# Patient Record
Sex: MALE | URBAN - METROPOLITAN AREA
[De-identification: names, ages, dates, MRNs, and addresses within clinical notes are randomized per-mention and may not be internally consistent; named-entity substitution may affect disease eponyms.]

---

## 2017-07-08 ENCOUNTER — EMERGENCY (EMERGENCY)
Facility: HOSPITAL | Age: 47
LOS: 0 days | Discharge: HOME | End: 2017-07-08

## 2017-07-08 DIAGNOSIS — R05 COUGH: ICD-10-CM

## 2017-07-08 DIAGNOSIS — F17.200 NICOTINE DEPENDENCE, UNSPECIFIED, UNCOMPLICATED: ICD-10-CM

## 2017-07-08 DIAGNOSIS — S29.011A STRAIN OF MUSCLE AND TENDON OF FRONT WALL OF THORAX, INITIAL ENCOUNTER: ICD-10-CM

## 2017-07-08 DIAGNOSIS — R07.9 CHEST PAIN, UNSPECIFIED: ICD-10-CM

## 2017-07-08 DIAGNOSIS — R07.81 PLEURODYNIA: ICD-10-CM
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
## 2017-11-27 ENCOUNTER — OUTPATIENT (OUTPATIENT)
Dept: OUTPATIENT SERVICES | Facility: HOSPITAL | Age: 47
LOS: 1 days | Discharge: HOME | End: 2017-11-27

## 2017-11-27 DIAGNOSIS — R31.21 ASYMPTOMATIC MICROSCOPIC HEMATURIA: ICD-10-CM

## 2017-11-27 DIAGNOSIS — R07.9 CHEST PAIN, UNSPECIFIED: ICD-10-CM

## 2018-05-20 ENCOUNTER — TRANSCRIPTION ENCOUNTER (OUTPATIENT)
Age: 48
End: 2018-05-20

## 2020-01-10 ENCOUNTER — OUTPATIENT (OUTPATIENT)
Dept: OUTPATIENT SERVICES | Facility: HOSPITAL | Age: 50
LOS: 1 days | Discharge: HOME | End: 2020-01-10
Payer: COMMERCIAL

## 2020-01-10 DIAGNOSIS — R52 PAIN, UNSPECIFIED: ICD-10-CM

## 2020-01-10 DIAGNOSIS — R07.9 CHEST PAIN, UNSPECIFIED: ICD-10-CM

## 2020-01-10 PROCEDURE — 76705 ECHO EXAM OF ABDOMEN: CPT | Mod: 26

## 2022-06-05 ENCOUNTER — EMERGENCY (EMERGENCY)
Facility: HOSPITAL | Age: 52
LOS: 0 days | Discharge: HOME | End: 2022-06-06
Attending: EMERGENCY MEDICINE | Admitting: EMERGENCY MEDICINE
Payer: COMMERCIAL

## 2022-06-05 VITALS
TEMPERATURE: 96 F | SYSTOLIC BLOOD PRESSURE: 169 MMHG | HEART RATE: 86 BPM | WEIGHT: 244.93 LBS | OXYGEN SATURATION: 100 % | HEIGHT: 72 IN | RESPIRATION RATE: 20 BRPM | DIASTOLIC BLOOD PRESSURE: 91 MMHG

## 2022-06-05 DIAGNOSIS — Z78.9 OTHER SPECIFIED HEALTH STATUS: Chronic | ICD-10-CM

## 2022-06-05 DIAGNOSIS — Z87.891 PERSONAL HISTORY OF NICOTINE DEPENDENCE: ICD-10-CM

## 2022-06-05 DIAGNOSIS — Z82.49 FAMILY HISTORY OF ISCHEMIC HEART DISEASE AND OTHER DISEASES OF THE CIRCULATORY SYSTEM: ICD-10-CM

## 2022-06-05 DIAGNOSIS — K21.9 GASTRO-ESOPHAGEAL REFLUX DISEASE WITHOUT ESOPHAGITIS: ICD-10-CM

## 2022-06-05 DIAGNOSIS — Z20.822 CONTACT WITH AND (SUSPECTED) EXPOSURE TO COVID-19: ICD-10-CM

## 2022-06-05 DIAGNOSIS — R07.9 CHEST PAIN, UNSPECIFIED: ICD-10-CM

## 2022-06-05 DIAGNOSIS — R05.9 COUGH, UNSPECIFIED: ICD-10-CM

## 2022-06-05 DIAGNOSIS — R07.89 OTHER CHEST PAIN: ICD-10-CM

## 2022-06-05 DIAGNOSIS — R06.02 SHORTNESS OF BREATH: ICD-10-CM

## 2022-06-05 LAB
ALBUMIN SERPL ELPH-MCNC: 4.3 G/DL — SIGNIFICANT CHANGE UP (ref 3.5–5.2)
ALP SERPL-CCNC: 83 U/L — SIGNIFICANT CHANGE UP (ref 30–115)
ALT FLD-CCNC: 33 U/L — SIGNIFICANT CHANGE UP (ref 0–41)
ANION GAP SERPL CALC-SCNC: 13 MMOL/L — SIGNIFICANT CHANGE UP (ref 7–14)
AST SERPL-CCNC: 45 U/L — HIGH (ref 0–41)
BASOPHILS # BLD AUTO: 0.07 K/UL — SIGNIFICANT CHANGE UP (ref 0–0.2)
BASOPHILS NFR BLD AUTO: 0.7 % — SIGNIFICANT CHANGE UP (ref 0–1)
BILIRUB SERPL-MCNC: 0.4 MG/DL — SIGNIFICANT CHANGE UP (ref 0.2–1.2)
BUN SERPL-MCNC: 6 MG/DL — LOW (ref 10–20)
CALCIUM SERPL-MCNC: 9.2 MG/DL — SIGNIFICANT CHANGE UP (ref 8.5–10.1)
CHLORIDE SERPL-SCNC: 102 MMOL/L — SIGNIFICANT CHANGE UP (ref 98–110)
CO2 SERPL-SCNC: 21 MMOL/L — SIGNIFICANT CHANGE UP (ref 17–32)
CREAT SERPL-MCNC: 1.1 MG/DL — SIGNIFICANT CHANGE UP (ref 0.7–1.5)
EGFR: 81 ML/MIN/1.73M2 — SIGNIFICANT CHANGE UP
EOSINOPHIL # BLD AUTO: 0.17 K/UL — SIGNIFICANT CHANGE UP (ref 0–0.7)
EOSINOPHIL NFR BLD AUTO: 1.6 % — SIGNIFICANT CHANGE UP (ref 0–8)
FLUAV AG NPH QL: SIGNIFICANT CHANGE UP
FLUBV AG NPH QL: SIGNIFICANT CHANGE UP
GLUCOSE SERPL-MCNC: 89 MG/DL — SIGNIFICANT CHANGE UP (ref 70–99)
HCT VFR BLD CALC: 43.9 % — SIGNIFICANT CHANGE UP (ref 42–52)
HGB BLD-MCNC: 15.4 G/DL — SIGNIFICANT CHANGE UP (ref 14–18)
IMM GRANULOCYTES NFR BLD AUTO: 0.5 % — HIGH (ref 0.1–0.3)
LYMPHOCYTES # BLD AUTO: 3.21 K/UL — SIGNIFICANT CHANGE UP (ref 1.2–3.4)
LYMPHOCYTES # BLD AUTO: 30.1 % — SIGNIFICANT CHANGE UP (ref 20.5–51.1)
MCHC RBC-ENTMCNC: 30.3 PG — SIGNIFICANT CHANGE UP (ref 27–31)
MCHC RBC-ENTMCNC: 35.1 G/DL — SIGNIFICANT CHANGE UP (ref 32–37)
MCV RBC AUTO: 86.2 FL — SIGNIFICANT CHANGE UP (ref 80–94)
MONOCYTES # BLD AUTO: 0.71 K/UL — HIGH (ref 0.1–0.6)
MONOCYTES NFR BLD AUTO: 6.7 % — SIGNIFICANT CHANGE UP (ref 1.7–9.3)
NEUTROPHILS # BLD AUTO: 6.45 K/UL — SIGNIFICANT CHANGE UP (ref 1.4–6.5)
NEUTROPHILS NFR BLD AUTO: 60.4 % — SIGNIFICANT CHANGE UP (ref 42.2–75.2)
NRBC # BLD: 0 /100 WBCS — SIGNIFICANT CHANGE UP (ref 0–0)
NT-PROBNP SERPL-SCNC: 37 PG/ML — SIGNIFICANT CHANGE UP (ref 0–300)
PLATELET # BLD AUTO: 319 K/UL — SIGNIFICANT CHANGE UP (ref 130–400)
POTASSIUM SERPL-MCNC: 5 MMOL/L — SIGNIFICANT CHANGE UP (ref 3.5–5)
POTASSIUM SERPL-SCNC: 5 MMOL/L — SIGNIFICANT CHANGE UP (ref 3.5–5)
PROT SERPL-MCNC: 7.4 G/DL — SIGNIFICANT CHANGE UP (ref 6–8)
RBC # BLD: 5.09 M/UL — SIGNIFICANT CHANGE UP (ref 4.7–6.1)
RBC # FLD: 12.8 % — SIGNIFICANT CHANGE UP (ref 11.5–14.5)
RSV RNA NPH QL NAA+NON-PROBE: SIGNIFICANT CHANGE UP
SARS-COV-2 RNA SPEC QL NAA+PROBE: SIGNIFICANT CHANGE UP
SODIUM SERPL-SCNC: 136 MMOL/L — SIGNIFICANT CHANGE UP (ref 135–146)
TROPONIN T SERPL-MCNC: <0.01 NG/ML — SIGNIFICANT CHANGE UP
TROPONIN T SERPL-MCNC: <0.01 NG/ML — SIGNIFICANT CHANGE UP
WBC # BLD: 10.66 K/UL — SIGNIFICANT CHANGE UP (ref 4.8–10.8)
WBC # FLD AUTO: 10.66 K/UL — SIGNIFICANT CHANGE UP (ref 4.8–10.8)

## 2022-06-05 PROCEDURE — 99220: CPT

## 2022-06-05 PROCEDURE — 93010 ELECTROCARDIOGRAM REPORT: CPT | Mod: 77

## 2022-06-05 PROCEDURE — 93010 ELECTROCARDIOGRAM REPORT: CPT

## 2022-06-05 NOTE — ED PROVIDER NOTE - NS ED ATTENDING STATEMENT MOD
This was a shared visit with the TAMMY. I reviewed and verified the documentation and independently performed the documented:

## 2022-06-05 NOTE — ED CDU PROVIDER INITIAL DAY NOTE - MEDICAL DECISION MAKING DETAILS
52-year-old man, history of GERD, recently quit smoking complains of chest tightness over the last 3 days, with cough.  Pain usually comes with cough, patient reports son had similar symptoms recently.  Vital signs, exam as noted.  ED work-up was unremarkable, plan is for telemetry, serial EKG and enzymes, provocative testing.

## 2022-06-05 NOTE — ED PROVIDER NOTE - NSICDXFAMILYHX_GEN_ALL_CORE_FT
FAMILY HISTORY:  No pertinent family history in first degree relatives FAMILY HISTORY:  Father  Still living? Yes, Estimated age: Age Unknown  FH: HTN (hypertension), Age at diagnosis: Age Unknown    Mother  Still living? No  FH: HTN (hypertension), Age at diagnosis: Age Unknown

## 2022-06-05 NOTE — ED CDU PROVIDER INITIAL DAY NOTE - RESPIRATORY NEGATIVE STATEMENT, MLM
Recommended Diet (from SLP):   1. Pureed solids.  2. Thin liquids, no straws.  3. Medications crushed in puree (I.e. pudding, applesauce, yogurt).    OP coordinator will call to set up video swallow study approximately two weeks from 4/10.   + chest pain, no cough, and no shortness of breath.

## 2022-06-05 NOTE — ED CDU PROVIDER INITIAL DAY NOTE - NSICDXFAMILYHX_GEN_ALL_CORE_FT
FAMILY HISTORY:  Father  Still living? Yes, Estimated age: Age Unknown  FH: HTN (hypertension), Age at diagnosis: Age Unknown    Mother  Still living? No  FH: HTN (hypertension), Age at diagnosis: Age Unknown

## 2022-06-05 NOTE — ED CDU PROVIDER INITIAL DAY NOTE - OBJECTIVE STATEMENT
52 y.o. male with pmx of gerd, former smoker quit 1/22 comes to ed complain of chest pain x 3 days, states son had uri type symptoms, admit to cough and chest discomfort.

## 2022-06-05 NOTE — ED PROVIDER NOTE - ATTENDING APP SHARED VISIT CONTRIBUTION OF CARE
52-year-old male past medical history as documented presenting with shortness of breath x3 days associated with cough and nasal congestion.  Also states he is having chest pain over this time described as tight, substernal, nonradiating, no palliative or provocative factors, mild severity.  Denies symptoms before.  Otherwise denies fever, nausea/vomiting/diarrhea, blood in stool or any other complaints.    Vital Signs: I have reviewed the initial vital signs.  Constitutional: NAD, well-nourished, appears stated age, no acute distress.  HEENT: Airway patent, moist MM, no erythema/swelling/deformity of oral structures. EOMI, PERRLA.  CV: regular rate, regular rhythm, well-perfused extremities, 2+ b/l DP and radial pulses equal.  Lungs: BCTA, no increased WOB.  ABD: NTND, no guarding or rebound, no pulsatile mass, no hernias.   MSK: Neck supple, nontender, nl ROM, no stepoff. Chest nontender. Back nontender in TLS spine or to b/l bony structures or flanks. Ext nontender, nl rom, no deformity.   INTEG: Skin warm, dry, no rash.  NEURO: A&Ox3, normal strength, nl sensation throughout, normal speech.   PSYCH: Calm, cooperative, normal affect and interaction.    Will obtain EKG, chest x-ray, labs, reeval.

## 2022-06-05 NOTE — ED ADULT NURSE NOTE - NSFALLRSKOUTCOME_ED_ALL_ED
Universal Safety Interventions Interpolation Flap Text: A decision was made to reconstruct the defect utilizing an interpolation axial flap and a staged reconstruction.  A telfa template was made of the defect.  This telfa template was then used to outline the interpolation flap.  The donor area for the pedicle flap was then injected with anesthesia.  The flap was excised through the skin and subcutaneous tissue down to the layer of the underlying musculature.  The interpolation flap was carefully excised within this deep plane to maintain its blood supply.  The edges of the donor site were undermined.   The donor site was closed in a primary fashion.  The pedicle was then rotated into position and sutured.  Once the tube was sutured into place, adequate blood supply was confirmed with blanching and refill.  The pedicle was then wrapped with xeroform gauze and dressed appropriately with a telfa and gauze bandage to ensure continued blood supply and protect the attached pedicle.

## 2022-06-05 NOTE — ED PROVIDER NOTE - OBJECTIVE STATEMENT
53 y/o M, PMHx GERD, presents to the ED with complaints of shortness of breath x three days. He admits to dyspnea upon exertion with accompanying cough and nasal congestion. He states that his son recently was ill with the flu. He admits to chest discomfort when coughing; denies abdominal pain, back pain, fever, chills, lower extremity swelling, nausea, and vomiting. He is a former smoker; denies FHx of CAD.

## 2022-06-05 NOTE — ED PROVIDER NOTE - CLINICAL SUMMARY MEDICAL DECISION MAKING FREE TEXT BOX
Patient presented with shortness of breath x 3 days associated with chest pain. Otherwise afebrile, Hd stable, well appearing. EKG obtained and non-ischemic without evidence of STEMI. Obtained labs which were grossly unremarkable including no significant leukocytosis, anemia, signs of dehydration/ELVIA, transaminitis or significant electrolyte abnormalities. Trop negative. Chest xray negative for pneumothorax, pneumonia, widened mediastinum, evidence of rib fractures, enlarged cardiac silhouette or any other emergent pathologies. Patient remained without recurrence of chest pain or abnormalities during monitoring in ED. Ambulatory without difficulty, tolerates PO. HEART Score 1, no PE risk factors and not tachycardic or hypoxic to suggest this, no concern clinically for dissection. Given the above, will place in obs for ACS rule out. Patient agreeable with plan.

## 2022-06-06 VITALS
TEMPERATURE: 97 F | DIASTOLIC BLOOD PRESSURE: 85 MMHG | HEART RATE: 80 BPM | OXYGEN SATURATION: 100 % | SYSTOLIC BLOOD PRESSURE: 162 MMHG | RESPIRATION RATE: 18 BRPM

## 2022-06-06 PROCEDURE — G1004: CPT

## 2022-06-06 PROCEDURE — 99217: CPT

## 2022-06-06 PROCEDURE — 93016 CV STRESS TEST SUPVJ ONLY: CPT

## 2022-06-06 PROCEDURE — 71045 X-RAY EXAM CHEST 1 VIEW: CPT | Mod: 26

## 2022-06-06 PROCEDURE — 93018 CV STRESS TEST I&R ONLY: CPT

## 2022-06-06 PROCEDURE — 78452 HT MUSCLE IMAGE SPECT MULT: CPT | Mod: 26,ME

## 2022-06-06 RX ORDER — ADENOSINE 3 MG/ML
60 INJECTION INTRAVENOUS ONCE
Refills: 0 | Status: COMPLETED | OUTPATIENT
Start: 2022-06-06 | End: 2022-06-06

## 2022-06-06 RX ADMIN — ADENOSINE 600 MILLIGRAM(S): 3 INJECTION INTRAVENOUS at 14:07

## 2022-06-06 NOTE — ED CDU PROVIDER SUBSEQUENT DAY NOTE - MEDICAL DECISION MAKING DETAILS
52-year-old man, history of GERD, recently quit smoking complains of chest tightness over the last 3 days, with cough.  Pain usually comes with cough, patient reports son had similar symptoms recently.  Vital signs, exam as noted.  ED work-up was unremarkable. Patient was monitored without incident, repeat EKG and enzymes unchanged. Nuclear stress test was negative for ischemia.  Results were discussed with patient, and he was given copies.  He will follow-up PMD and return to the ED for persistent or worsening symptoms.

## 2022-06-06 NOTE — ED CDU PROVIDER SUBSEQUENT DAY NOTE - NS ED MD PROGRESS NOTE PROVIDER NAME FT
EVELYNE Jay Secondary Intention Text (Leave Blank If You Do Not Want): The defect will heal with secondary intention.

## 2022-06-06 NOTE — ED CDU PROVIDER DISPOSITION NOTE - CLINICAL COURSE
Admitted to EDOU for dyspnea on exertion. Trop neg x2, ECG WNL, stress test NL. Will f/u w/ cardiologist.

## 2022-06-06 NOTE — ED CDU PROVIDER DISPOSITION NOTE - PATIENT PORTAL LINK FT
You can access the FollowMyHealth Patient Portal offered by [facility] by registering at the following website: [email or web address]. By joining [employer]’s FollowMyHealth portal, you will also be able to view your health information using other applications (apps) compatible with our system.

## 2022-06-06 NOTE — ED CDU PROVIDER SUBSEQUENT DAY NOTE - ATTENDING APP SHARED VISIT CONTRIBUTION OF CARE
52-year-old man, history of GERD, recently quit smoking complains of chest tightness over the last 3 days, with cough.  Pain usually comes with cough, patient reports son had similar symptoms recently.  Vital signs, exam as noted.  ED work-up was unremarkable. Patient was monitored without incident, repeat EKG and enzymes unchanged.  Plan is for nuclear stress test.

## 2022-06-06 NOTE — ED CDU PROVIDER DISPOSITION NOTE - CARE PROVIDER_API CALL
Pj Ba)  Cardiovascular Disease; Interventional Cardiology  [address] [place] [address]  Phone: [phone]  Fax: [phone]  Established Patient  Follow Up Time: 1-3 Days

## 2022-06-06 NOTE — ED CDU PROVIDER DISPOSITION NOTE - NSFOLLOWUPINSTRUCTIONS_ED_ALL_ED_FT
Please follow up with Dr Ba in 1-3 days for further evaluation. Return to the emergency department sooner for any new or worsening symptoms.      Shortness of breath    Shortness of breath means you have trouble breathing and could indicate a medical problem. Causes include lung diseases, heart disease, low amount of red blood cells (anemia), poor physical fitness, being overweight, smoking, etc. Your health care provider may not be able to find a cause for your shortness of breath after your exam. In this case, it is important to have a follow-up exam with your primary care physician as instructed. If medicines were prescribed, take them as directed for the full length of time directed. Refrain from tobacco products.    SEEK IMMEDIATE MEDICAL CARE IF YOU HAVE THE FOLLOWING SYMPTOMS: worsening shortness of breath, chest pain, back pain, abdominal pain, fever, coughing up blood, lightheadedness/dizziness.

## 2022-06-06 NOTE — ED CDU PROVIDER SUBSEQUENT DAY NOTE - PROGRESS NOTE DETAILS
Pt resting comfortably, denies complaints at this time. Resting comfortably. Reports hx of dyspnea on exertion preceding son's flu symptoms. Pending pharm stress as pt has hx of knee issues. Reassess.

## 2022-07-26 NOTE — ED ADULT NURSE NOTE - TEMPLATE
Regular rate & rhythm, normal S1, S2; no murmurs, gallops or rubs; no S3, S4
Respiratory
Normal vision: sees adequately in most situations; can see medication labels, newsprint

## 2024-05-21 PROBLEM — K21.9 GASTRO-ESOPHAGEAL REFLUX DISEASE WITHOUT ESOPHAGITIS: Chronic | Status: ACTIVE | Noted: 2022-06-05

## 2024-06-06 ENCOUNTER — APPOINTMENT (OUTPATIENT)
Dept: ORTHOPEDIC SURGERY | Facility: CLINIC | Age: 54
End: 2024-06-06
Payer: COMMERCIAL

## 2024-06-06 ENCOUNTER — TRANSCRIPTION ENCOUNTER (OUTPATIENT)
Age: 54
End: 2024-06-06

## 2024-06-06 DIAGNOSIS — M16.12 UNILATERAL PRIMARY OSTEOARTHRITIS, LEFT HIP: ICD-10-CM

## 2024-06-06 PROBLEM — Z00.00 ENCOUNTER FOR PREVENTIVE HEALTH EXAMINATION: Status: ACTIVE | Noted: 2024-06-06

## 2024-06-06 PROCEDURE — 99203 OFFICE O/P NEW LOW 30 MIN: CPT

## 2024-06-06 PROCEDURE — 73522 X-RAY EXAM HIPS BI 3-4 VIEWS: CPT

## 2024-06-06 NOTE — ASSESSMENT
[FreeTextEntry1] : 54-year-old  with advanced left hip arthritis.  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  Ultimately this patient is probably going to benefit from a hip replacement which will significantly improve his pain and allow him to function as a .  He could try cortisone injection in his left hip provided by interventional radiology which might mitigate the symptoms temporarily.  Alternatively he could consider hip replacement.  This would entail at a minimum probably 6 weeks of hiatus from work.  We can reach out to Worker's Compensation to see if he is eligible for a Worker's Compensation case to allow the surgery be done under the auspices of Worker's Compensation.  This process is going to take a prolonged period of time we can arrange for the cortisone injection.  In the interim I will have him continue with the Clofenax and what ever physical therapy he can tolerate to improve and maintain his function.

## 2024-06-06 NOTE — HISTORY OF PRESENT ILLNESS
[de-identified] : 54-year-old  for the [employer] presents on referral for evaluation of left groin and buttock pain.  He has been followed by pain management for back pain although he does not really report much in the way of back pain.  He has been offered an epidural but is reticent to proceed with this.  He had an MRI which incidentally picked up an adrenal mass and is now scheduled for CT of his abdomen to rule adrenal cortical adenoma.  He does not report any particularly rapid heart rate or other signs of adrenal disease.  When he does have his complaints of buttock and groin pain.  This is unremitting pain that makes it difficult for him to sleep or walk distances.  This pain has been worsening over the last year without clear trauma.  He is currently on diclofenac and gabapentin teen which are not meaningfully improving his symptoms.  He is interested in to what is causing his pain and what can be done about it so he can continue to work as a  for the [employer].  Past medical history: Benign prostatic hypertrophy Being worked up for adrenal mass May have hyperlipidemia he is also followed by Dr. Chaves; worked up for cardiac issue which turned out to be negative.  Medications: Gabapentin Diclofenac  Allergies NKDA  Social: , lives with wife, former smoker now utilizes a vape, rare social EtOH, no drug use Works for [employer] as a

## 2024-06-06 NOTE — IMAGING
[de-identified] : Pleasant early middle-aged gentleman walks into my office with clear antalgia.  Physical examination: Left hip: Tenderness palpation about the inguinal crease without clear lymph nodes.  No tenderness of the trochanteric bursa.  Full extension the patient has internal rotation of 10 degrees with pain.  At 90 degrees of flexion he essentially has no internal rotation and has to abduct his leg to accommodate 90 degrees of flexion and slight external rotation.  Radiographs: Left hip (AP pelvis, lateral left hip): Advanced left hip arthritis with loss of the left hip joint space.  There is subchondral sclerotic changes and early subchondral cystic changes within the femoral head in the weightbearing portion of the left acetabulum.

## 2024-09-06 ENCOUNTER — APPOINTMENT (OUTPATIENT)
Dept: ORTHOPEDIC SURGERY | Facility: CLINIC | Age: 54
End: 2024-09-06
Payer: COMMERCIAL

## 2024-09-06 DIAGNOSIS — M16.12 UNILATERAL PRIMARY OSTEOARTHRITIS, LEFT HIP: ICD-10-CM

## 2024-09-06 PROCEDURE — 99214 OFFICE O/P EST MOD 30 MIN: CPT

## 2024-09-06 NOTE — HISTORY OF PRESENT ILLNESS
[de-identified] : 54-year-old gentleman returns to the office with severe anterior lateral left hip pain.  Working diagnosis left hip arthritis.  He has been followed by pain management who has been giving him a series of injections about the trochanteric bursa.  He has had 3 cortisone injections the last was last Wednesday.  None of which have significantly helped his pain.  He remains on tizanidine and meloxicam.  Finds tizanidine helps him sleep.  Denies any new trauma.  No fevers or constitutional symptoms.

## 2024-09-06 NOTE — ASSESSMENT
[FreeTextEntry1] : 54-year-old gentleman with advanced left hip arthritis failing conservative management of physical therapy NSAIDs started at his last visit now would benefit from total hip arthroplasty.  Because of his recent cortisone injection would hold off for at least 3 months prior to surgery.  I discussed with the patient my findings and recommendations.  Reviewed the nature of the surgery with risk and benefits.  He like to proceed in this timely fashion possible.  Will make arrangements for surgery in 3 months.  He will need preadmission testing.

## 2024-09-06 NOTE — IMAGING
[de-identified] : Pleasant early middle-aged gentleman walks in with antalgia.  Physical examination: Left hip: Tenderness palpation of the trochanteric bursa as well as over the anterior lateral aspect of his hip.  He has pain with rotation of the hip joint at full extension 9 degrees of flexion.  Rotation is limited.  Flexion is also limited secondary to this pain.  He has no geniculate lymph nodes or masses.  Radiographs: Deferred; radiographs from June 6, 2024 reviewed.  Demonstrate loss of joint space of the left hip with subchondral cystic changes.

## 2024-09-24 NOTE — ED ADULT NURSE NOTE - NS ED NURSE DC PT EDUCATION RESOURCES
Quality 130: Documentation Of Current Medications In The Medical Record: Current Medications Documented Detail Level: Detailed Quality 226: Preventive Care And Screening: Tobacco Use: Screening And Cessation Intervention: Patient screened for tobacco use and is an ex/non-smoker None needed

## 2024-10-16 ENCOUNTER — TRANSCRIPTION ENCOUNTER (OUTPATIENT)
Age: 54
End: 2024-10-16

## 2024-10-17 ENCOUNTER — TRANSCRIPTION ENCOUNTER (OUTPATIENT)
Age: 54
End: 2024-10-17

## 2024-12-03 ENCOUNTER — RESULT REVIEW (OUTPATIENT)
Age: 54
End: 2024-12-03

## 2024-12-03 ENCOUNTER — OUTPATIENT (OUTPATIENT)
Dept: OUTPATIENT SERVICES | Facility: HOSPITAL | Age: 54
LOS: 1 days | End: 2024-12-03
Payer: COMMERCIAL

## 2024-12-03 VITALS
WEIGHT: 255.07 LBS | HEART RATE: 86 BPM | TEMPERATURE: 98 F | RESPIRATION RATE: 18 BRPM | SYSTOLIC BLOOD PRESSURE: 160 MMHG | HEIGHT: 70 IN | OXYGEN SATURATION: 99 % | DIASTOLIC BLOOD PRESSURE: 97 MMHG

## 2024-12-03 DIAGNOSIS — M16.12 UNILATERAL PRIMARY OSTEOARTHRITIS, LEFT HIP: ICD-10-CM

## 2024-12-03 DIAGNOSIS — Z01.818 ENCOUNTER FOR OTHER PREPROCEDURAL EXAMINATION: ICD-10-CM

## 2024-12-03 DIAGNOSIS — Z78.9 OTHER SPECIFIED HEALTH STATUS: Chronic | ICD-10-CM

## 2024-12-03 LAB
A1C WITH ESTIMATED AVERAGE GLUCOSE RESULT: 6.4 % — HIGH (ref 4–5.6)
ALBUMIN SERPL ELPH-MCNC: 4.5 G/DL — SIGNIFICANT CHANGE UP (ref 3.5–5.2)
ALP SERPL-CCNC: 94 U/L — SIGNIFICANT CHANGE UP (ref 30–115)
ALT FLD-CCNC: 58 U/L — HIGH (ref 0–41)
ANION GAP SERPL CALC-SCNC: 16 MMOL/L — HIGH (ref 7–14)
APTT BLD: 31.7 SEC — SIGNIFICANT CHANGE UP (ref 27–39.2)
AST SERPL-CCNC: 40 U/L — SIGNIFICANT CHANGE UP (ref 0–41)
BASOPHILS # BLD AUTO: 0.07 K/UL — SIGNIFICANT CHANGE UP (ref 0–0.2)
BASOPHILS NFR BLD AUTO: 0.8 % — SIGNIFICANT CHANGE UP (ref 0–1)
BILIRUB SERPL-MCNC: 0.5 MG/DL — SIGNIFICANT CHANGE UP (ref 0.2–1.2)
BLD GP AB SCN SERPL QL: SIGNIFICANT CHANGE UP
BUN SERPL-MCNC: 10 MG/DL — SIGNIFICANT CHANGE UP (ref 10–20)
CALCIUM SERPL-MCNC: 10 MG/DL — SIGNIFICANT CHANGE UP (ref 8.4–10.5)
CHLORIDE SERPL-SCNC: 98 MMOL/L — SIGNIFICANT CHANGE UP (ref 98–110)
CO2 SERPL-SCNC: 25 MMOL/L — SIGNIFICANT CHANGE UP (ref 17–32)
CREAT SERPL-MCNC: 1 MG/DL — SIGNIFICANT CHANGE UP (ref 0.7–1.5)
EGFR: 90 ML/MIN/1.73M2 — SIGNIFICANT CHANGE UP
EOSINOPHIL # BLD AUTO: 0.15 K/UL — SIGNIFICANT CHANGE UP (ref 0–0.7)
EOSINOPHIL NFR BLD AUTO: 1.7 % — SIGNIFICANT CHANGE UP (ref 0–8)
ESTIMATED AVERAGE GLUCOSE: 137 MG/DL — HIGH (ref 68–114)
GLUCOSE SERPL-MCNC: 169 MG/DL — HIGH (ref 70–99)
HCT VFR BLD CALC: 47.3 % — SIGNIFICANT CHANGE UP (ref 42–52)
HGB BLD-MCNC: 15.9 G/DL — SIGNIFICANT CHANGE UP (ref 14–18)
IMM GRANULOCYTES NFR BLD AUTO: 0.8 % — HIGH (ref 0.1–0.3)
INR BLD: 0.88 RATIO — SIGNIFICANT CHANGE UP (ref 0.65–1.3)
LYMPHOCYTES # BLD AUTO: 2.83 K/UL — SIGNIFICANT CHANGE UP (ref 1.2–3.4)
LYMPHOCYTES # BLD AUTO: 32.4 % — SIGNIFICANT CHANGE UP (ref 20.5–51.1)
MCHC RBC-ENTMCNC: 29.1 PG — SIGNIFICANT CHANGE UP (ref 27–31)
MCHC RBC-ENTMCNC: 33.6 G/DL — SIGNIFICANT CHANGE UP (ref 32–37)
MCV RBC AUTO: 86.6 FL — SIGNIFICANT CHANGE UP (ref 80–94)
MONOCYTES # BLD AUTO: 0.66 K/UL — HIGH (ref 0.1–0.6)
MONOCYTES NFR BLD AUTO: 7.6 % — SIGNIFICANT CHANGE UP (ref 1.7–9.3)
MRSA PCR RESULT.: NEGATIVE — SIGNIFICANT CHANGE UP
NEUTROPHILS # BLD AUTO: 4.95 K/UL — SIGNIFICANT CHANGE UP (ref 1.4–6.5)
NEUTROPHILS NFR BLD AUTO: 56.7 % — SIGNIFICANT CHANGE UP (ref 42.2–75.2)
NRBC # BLD: 0 /100 WBCS — SIGNIFICANT CHANGE UP (ref 0–0)
PLATELET # BLD AUTO: 269 K/UL — SIGNIFICANT CHANGE UP (ref 130–400)
PMV BLD: 9.2 FL — SIGNIFICANT CHANGE UP (ref 7.4–10.4)
POTASSIUM SERPL-MCNC: 4.3 MMOL/L — SIGNIFICANT CHANGE UP (ref 3.5–5)
POTASSIUM SERPL-SCNC: 4.3 MMOL/L — SIGNIFICANT CHANGE UP (ref 3.5–5)
PROT SERPL-MCNC: 7.1 G/DL — SIGNIFICANT CHANGE UP (ref 6–8)
PROTHROM AB SERPL-ACNC: 10.3 SEC — SIGNIFICANT CHANGE UP (ref 9.95–12.87)
RBC # BLD: 5.46 M/UL — SIGNIFICANT CHANGE UP (ref 4.7–6.1)
RBC # FLD: 13.3 % — SIGNIFICANT CHANGE UP (ref 11.5–14.5)
SODIUM SERPL-SCNC: 139 MMOL/L — SIGNIFICANT CHANGE UP (ref 135–146)
WBC # BLD: 8.73 K/UL — SIGNIFICANT CHANGE UP (ref 4.8–10.8)
WBC # FLD AUTO: 8.73 K/UL — SIGNIFICANT CHANGE UP (ref 4.8–10.8)

## 2024-12-03 PROCEDURE — 85730 THROMBOPLASTIN TIME PARTIAL: CPT

## 2024-12-03 PROCEDURE — 86901 BLOOD TYPING SEROLOGIC RH(D): CPT

## 2024-12-03 PROCEDURE — 85025 COMPLETE CBC W/AUTO DIFF WBC: CPT

## 2024-12-03 PROCEDURE — 99214 OFFICE O/P EST MOD 30 MIN: CPT | Mod: 25

## 2024-12-03 PROCEDURE — 73502 X-RAY EXAM HIP UNI 2-3 VIEWS: CPT | Mod: 26,LT

## 2024-12-03 PROCEDURE — 86850 RBC ANTIBODY SCREEN: CPT

## 2024-12-03 PROCEDURE — 36415 COLL VENOUS BLD VENIPUNCTURE: CPT

## 2024-12-03 PROCEDURE — 87641 MR-STAPH DNA AMP PROBE: CPT

## 2024-12-03 PROCEDURE — 87640 STAPH A DNA AMP PROBE: CPT

## 2024-12-03 PROCEDURE — 83036 HEMOGLOBIN GLYCOSYLATED A1C: CPT

## 2024-12-03 PROCEDURE — 85610 PROTHROMBIN TIME: CPT

## 2024-12-03 PROCEDURE — 73502 X-RAY EXAM HIP UNI 2-3 VIEWS: CPT | Mod: LT

## 2024-12-03 PROCEDURE — 80053 COMPREHEN METABOLIC PANEL: CPT

## 2024-12-03 PROCEDURE — 86900 BLOOD TYPING SEROLOGIC ABO: CPT

## 2024-12-03 NOTE — H&P PST ADULT - REASON FOR ADMISSION
Case Type: OP Block TimeSuite: [facility] Mohsen [facility]  Confirmed Surgery DateTime: 12- - 0:00PAST DateTime: 12- - 9:30Procedure: DIRECT ANTERIOR LEFT TOTAL HIP REPLACEMENT (C-ARM)  ERP?: NoLaterality: LeftLength of Procedure: 120 Minutes  Anesthesia Type: Regional

## 2024-12-03 NOTE — H&P PST ADULT - HISTORY OF PRESENT ILLNESS
55 y/o M  presents to PAST with a 12 month history of LEFT hjp pain. Pt is moderately active and reports having sharp and "very painfull"  hip pain with prolonged walking or sitting only. Pt has tried 2 months of rest w/o anti-inflammatory medications, follow by a 3 month course of physical therapy w/o significant relief. In addition, the pt received cortisone injection 3 months ago ( in September)  with provided 100 % relief of pain for 2-3 weeks. pain is present with flexion and extension.  PATIENT/GUARDIAN CURRENTLY DENIES CHEST PAIN  SHORTNESS OF BREATH  PALPITATIONS,  DYSURIA, OR UPPER RESPIRATORY INFECTION IN PAST 2 WEEKS    Anesthesia Alert  NO--Difficult Airway, class IV  NO--History of neck surgery or radiation  NO--Limited ROM of neck  NO--History of Malignant hyperthermia  NO--No personal or family history of Pseudocholinesterase deficiency.  NO--Prior Anesthesia Complication  NO--Latex Allergy  NO--Loose teeth  NO--History of Rheumatoid Arthritis  NO--Bleeding risk  NO--EFREM  NO--Other  RCRI 0  [facility] Activity Status Index (DASI) from [employer]  on 12/3/2024  ** All calculations should be rechecked by clinician prior to use **    RESULT SUMMARY:  24.2 points  The higher the score (maximum 58.2), the higher the functional status.    5.72 METs    INPUTS:  Take care of self —> 2.75 = Yes  Walk indoors —> 1.75 = Yes  Walk 1&ndash;2 blocks on level ground —> 2.75 = Yes  Climb a flight of stairs or walk up a hill —> 5.5 = Yes  Run a short distance —> 0 = No  Do light work around the house —> 2.7 = Yes  Do moderate work around the house —> 3.5 = Yes  Do heavy work around the house —> 0 = No  Do yardwork —> 0 = No  Have sexual relations —> 5.25 = Yes  Participate in moderate recreational activities —> 0 = No  Participate in strenuous sports —> 0 = No    PT/GUARDIAN DENIES ANY RASHES, ABRASION, OR OPEN WOUNDS OR CUTS    AS PER THE PT/GUARDIAN, THIS IS HIS/HER COMPLETE MEDICAL AND SURGICAL HX, INCLUDING MEDICATIONS PRESCRIBED AND OVER THE COUNTER  Patient/guardian understands the instructions and was given the opportunity to ask questions and have them answered.  pt/guardian denies any suicidal ideation or thoughts, pt states not a threat to self or others

## 2024-12-04 DIAGNOSIS — Z01.818 ENCOUNTER FOR OTHER PREPROCEDURAL EXAMINATION: ICD-10-CM

## 2024-12-04 DIAGNOSIS — M16.12 UNILATERAL PRIMARY OSTEOARTHRITIS, LEFT HIP: ICD-10-CM

## 2024-12-04 RX ORDER — CELECOXIB 200 MG/1
400 CAPSULE ORAL ONCE
Refills: 0 | Status: COMPLETED | OUTPATIENT
Start: 2024-12-12 | End: 2024-12-12

## 2024-12-05 RX ORDER — ACETAMINOPHEN 500MG 500 MG/1
1000 TABLET, COATED ORAL ONCE
Refills: 0 | Status: COMPLETED | OUTPATIENT
Start: 2024-12-12 | End: 2024-12-12

## 2024-12-06 PROBLEM — E66.9 OBESITY, UNSPECIFIED: Chronic | Status: INACTIVE | Noted: 2024-12-03 | Resolved: 2024-12-05

## 2024-12-12 ENCOUNTER — RESULT REVIEW (OUTPATIENT)
Age: 54
End: 2024-12-12

## 2024-12-12 ENCOUNTER — APPOINTMENT (OUTPATIENT)
Dept: ORTHOPEDIC SURGERY | Facility: HOSPITAL | Age: 54
End: 2024-12-12

## 2024-12-12 ENCOUNTER — INPATIENT (INPATIENT)
Facility: HOSPITAL | Age: 54
LOS: 0 days | Discharge: HOME HEALTH | DRG: 470 | End: 2024-12-13
Attending: ORTHOPAEDIC SURGERY | Admitting: ORTHOPAEDIC SURGERY
Payer: COMMERCIAL

## 2024-12-12 ENCOUNTER — TRANSCRIPTION ENCOUNTER (OUTPATIENT)
Age: 54
End: 2024-12-12

## 2024-12-12 VITALS
SYSTOLIC BLOOD PRESSURE: 159 MMHG | TEMPERATURE: 98 F | DIASTOLIC BLOOD PRESSURE: 94 MMHG | OXYGEN SATURATION: 99 % | WEIGHT: 255.07 LBS | HEART RATE: 93 BPM | RESPIRATION RATE: 17 BRPM | HEIGHT: 70 IN

## 2024-12-12 DIAGNOSIS — Z78.9 OTHER SPECIFIED HEALTH STATUS: Chronic | ICD-10-CM

## 2024-12-12 DIAGNOSIS — M16.12 UNILATERAL PRIMARY OSTEOARTHRITIS, LEFT HIP: ICD-10-CM

## 2024-12-12 LAB
BLD GP AB SCN SERPL QL: SIGNIFICANT CHANGE UP
GLUCOSE BLDC GLUCOMTR-MCNC: 124 MG/DL — HIGH (ref 70–99)

## 2024-12-12 PROCEDURE — 88305 TISSUE EXAM BY PATHOLOGIST: CPT | Mod: 26

## 2024-12-12 PROCEDURE — 36415 COLL VENOUS BLD VENIPUNCTURE: CPT

## 2024-12-12 PROCEDURE — 97165 OT EVAL LOW COMPLEX 30 MIN: CPT | Mod: GO

## 2024-12-12 PROCEDURE — 80048 BASIC METABOLIC PNL TOTAL CA: CPT

## 2024-12-12 PROCEDURE — 27130 TOTAL HIP ARTHROPLASTY: CPT | Mod: LT

## 2024-12-12 PROCEDURE — 88311 DECALCIFY TISSUE: CPT | Mod: 26

## 2024-12-12 PROCEDURE — 97110 THERAPEUTIC EXERCISES: CPT | Mod: GP

## 2024-12-12 PROCEDURE — 97162 PT EVAL MOD COMPLEX 30 MIN: CPT | Mod: GP

## 2024-12-12 PROCEDURE — 85027 COMPLETE CBC AUTOMATED: CPT

## 2024-12-12 PROCEDURE — 94010 BREATHING CAPACITY TEST: CPT

## 2024-12-12 PROCEDURE — 97116 GAIT TRAINING THERAPY: CPT | Mod: GP

## 2024-12-12 RX ORDER — POLYETHYLENE GLYCOL 3350 17 G/17G
17 POWDER, FOR SOLUTION ORAL
Qty: 0 | Refills: 0 | DISCHARGE
Start: 2024-12-12

## 2024-12-12 RX ORDER — DEXAMETHASONE 1.5 MG/1
4 TABLET ORAL ONCE
Refills: 0 | Status: COMPLETED | OUTPATIENT
Start: 2024-12-13 | End: 2024-12-13

## 2024-12-12 RX ORDER — POLYETHYLENE GLYCOL 3350 17 G/17G
17 POWDER, FOR SOLUTION ORAL AT BEDTIME
Refills: 0 | Status: DISCONTINUED | OUTPATIENT
Start: 2024-12-12 | End: 2024-12-13

## 2024-12-12 RX ORDER — HYDROMORPHONE HYDROCHLORIDE 2 MG/1
0.5 TABLET ORAL
Refills: 0 | Status: DISCONTINUED | OUTPATIENT
Start: 2024-12-12 | End: 2024-12-12

## 2024-12-12 RX ORDER — MAGNESIUM, ALUMINUM HYDROXIDE 200-225/5
30 SUSPENSION, ORAL (FINAL DOSE FORM) ORAL
Refills: 0 | Status: DISCONTINUED | OUTPATIENT
Start: 2024-12-12 | End: 2024-12-13

## 2024-12-12 RX ORDER — 0.9 % SODIUM CHLORIDE 0.9 %
1000 INTRAVENOUS SOLUTION INTRAVENOUS
Refills: 0 | Status: DISCONTINUED | OUTPATIENT
Start: 2024-12-12 | End: 2024-12-12

## 2024-12-12 RX ORDER — PANTOPRAZOLE SODIUM 40 MG/1
1 TABLET, DELAYED RELEASE ORAL
Qty: 30 | Refills: 0
Start: 2024-12-12 | End: 2025-01-10

## 2024-12-12 RX ORDER — KETOROLAC TROMETHAMINE 30 MG/ML
15 INJECTION INTRAMUSCULAR; INTRAVENOUS EVERY 6 HOURS
Refills: 0 | Status: DISCONTINUED | OUTPATIENT
Start: 2024-12-12 | End: 2024-12-13

## 2024-12-12 RX ORDER — CEFAZOLIN SODIUM 10 G
3000 VIAL (EA) INJECTION EVERY 8 HOURS
Refills: 0 | Status: COMPLETED | OUTPATIENT
Start: 2024-12-12 | End: 2024-12-13

## 2024-12-12 RX ORDER — INFLUENZA VIRUS VACCINE 15; 15; 15; 15 UG/.5ML; UG/.5ML; UG/.5ML; UG/.5ML
0.5 SUSPENSION INTRAMUSCULAR ONCE
Refills: 0 | Status: DISCONTINUED | OUTPATIENT
Start: 2024-12-12 | End: 2024-12-13

## 2024-12-12 RX ORDER — TIZANIDINE 4 MG/1
1 TABLET ORAL
Refills: 0 | DISCHARGE

## 2024-12-12 RX ORDER — PANTOPRAZOLE SODIUM 40 MG/1
40 TABLET, DELAYED RELEASE ORAL
Refills: 0 | Status: DISCONTINUED | OUTPATIENT
Start: 2024-12-12 | End: 2024-12-13

## 2024-12-12 RX ORDER — IBUPROFEN 200 MG
1 TABLET ORAL
Qty: 42 | Refills: 0
Start: 2024-12-12 | End: 2024-12-25

## 2024-12-12 RX ORDER — CHLORHEXIDINE GLUCONATE 1.2 MG/ML
1 RINSE ORAL
Refills: 0 | Status: DISCONTINUED | OUTPATIENT
Start: 2024-12-12 | End: 2024-12-13

## 2024-12-12 RX ORDER — SODIUM CHLORIDE 9 MG/ML
1000 INJECTION, SOLUTION INTRAMUSCULAR; INTRAVENOUS; SUBCUTANEOUS
Refills: 0 | Status: DISCONTINUED | OUTPATIENT
Start: 2024-12-12 | End: 2024-12-13

## 2024-12-12 RX ORDER — SENNOSIDES 8.6 MG
2 TABLET ORAL
Qty: 30 | Refills: 0
Start: 2024-12-12

## 2024-12-12 RX ORDER — ONDANSETRON HYDROCHLORIDE 4 MG/1
4 TABLET, FILM COATED ORAL EVERY 6 HOURS
Refills: 0 | Status: DISCONTINUED | OUTPATIENT
Start: 2024-12-12 | End: 2024-12-13

## 2024-12-12 RX ORDER — TRAMADOL HYDROCHLORIDE 300 MG/1
50 CAPSULE ORAL EVERY 4 HOURS
Refills: 0 | Status: DISCONTINUED | OUTPATIENT
Start: 2024-12-12 | End: 2024-12-13

## 2024-12-12 RX ORDER — SENNOSIDES 8.6 MG
2 TABLET ORAL AT BEDTIME
Refills: 0 | Status: DISCONTINUED | OUTPATIENT
Start: 2024-12-12 | End: 2024-12-13

## 2024-12-12 RX ORDER — NALOXONE HCL 0.4 MG/ML
1 AMPUL (ML) INJECTION
Qty: 1 | Refills: 0
Start: 2024-12-12

## 2024-12-12 RX ORDER — CELECOXIB 200 MG/1
200 CAPSULE ORAL EVERY 12 HOURS
Refills: 0 | Status: DISCONTINUED | OUTPATIENT
Start: 2024-12-13 | End: 2024-12-13

## 2024-12-12 RX ORDER — TRAMADOL HYDROCHLORIDE 300 MG/1
1 CAPSULE ORAL
Qty: 30 | Refills: 0
Start: 2024-12-12

## 2024-12-12 RX ADMIN — Medication 75 MILLILITER(S): at 14:35

## 2024-12-12 RX ADMIN — Medication 81 MILLIGRAM(S): at 18:04

## 2024-12-12 RX ADMIN — ACETAMINOPHEN 500MG 1000 MILLIGRAM(S): 500 TABLET, COATED ORAL at 09:54

## 2024-12-12 RX ADMIN — Medication 200 MILLIGRAM(S): at 21:37

## 2024-12-12 RX ADMIN — CELECOXIB 400 MILLIGRAM(S): 200 CAPSULE ORAL at 09:25

## 2024-12-12 RX ADMIN — KETOROLAC TROMETHAMINE 15 MILLIGRAM(S): 30 INJECTION INTRAMUSCULAR; INTRAVENOUS at 18:55

## 2024-12-12 RX ADMIN — TRAMADOL HYDROCHLORIDE 50 MILLIGRAM(S): 300 CAPSULE ORAL at 22:45

## 2024-12-12 RX ADMIN — ACETAMINOPHEN 500MG 1000 MILLIGRAM(S): 500 TABLET, COATED ORAL at 09:24

## 2024-12-12 RX ADMIN — KETOROLAC TROMETHAMINE 15 MILLIGRAM(S): 30 INJECTION INTRAMUSCULAR; INTRAVENOUS at 23:11

## 2024-12-12 RX ADMIN — TRAMADOL HYDROCHLORIDE 50 MILLIGRAM(S): 300 CAPSULE ORAL at 21:50

## 2024-12-12 RX ADMIN — CELECOXIB 400 MILLIGRAM(S): 200 CAPSULE ORAL at 09:54

## 2024-12-12 RX ADMIN — KETOROLAC TROMETHAMINE 15 MILLIGRAM(S): 30 INJECTION INTRAMUSCULAR; INTRAVENOUS at 18:03

## 2024-12-12 NOTE — PHYSICAL THERAPY INITIAL EVALUATION ADULT - PERTINENT HX OF CURRENT PROBLEM, REHAB EVAL
Pt is a 55 y/o male with dx of elective L THR with h/o L hip OA under regional anesthesia with direct anterior approach seen pod #0.

## 2024-12-12 NOTE — BRIEF OPERATIVE NOTE - OPERATION/FINDINGS
above; post op WBAT, with DA precautions; Abx and DVT ppx per protocol  Dict:  Implants: Depuy Actis size 7 standard offset stem with 56mm [place] cup with unhooded liner; +5/40mm Biolox femoral head above; post op WBAT, with DA precautions; Abx and DVT ppx per protocol  Dict:[id number]  Implants: Roadhopuy Actis size 7 standard offset stem with 56mm [place] cup with unhooded liner; +5/40mm Biolox femoral head

## 2024-12-12 NOTE — DISCHARGE NOTE PROVIDER - NSDCCPCAREPLAN_GEN_ALL_CORE_FT
PRINCIPAL DISCHARGE DIAGNOSIS  Diagnosis: Arthritis of left hip  Assessment and Plan of Treatment: Keep surgical site clean and dry, may remove dressing in 6  days . Call your surgeon if any wound drainage, redness , increasing pain, fevers over 101 or if you have any questions or concerns.  Ice pack to affected area q4-6h as needed   You may shower with the bandage on and once it is removed. Once it is removed  , do not scrub surgical site. Do not apply any lotions/moisturizers/creams to surgical site.  Take aspirin 81 mg twice daily for 30 days to lower the risk of blood clots.  Call to make your  post op appointment if you do not have one already.

## 2024-12-12 NOTE — DISCHARGE NOTE PROVIDER - NSDCFUSCHEDAPPT_GEN_ALL_CORE_FT
Mohsen Fern[facility]dez [facility]  [facility] [address]  Scheduled Appointment: 12/27/2024

## 2024-12-12 NOTE — DISCHARGE NOTE PROVIDER - HOSPITAL COURSE
54 year old male admitted for an elective Total Hip Arthroplasty. The patient tolerated surgery well with no intra/post operative complications. The patient received intra/post operative antibiotics for infection prophylaxis and will be discharged on Aspirin 81mg twice daily for 30 days to lower the risk of blood clots. The patient worked with Physical Therapy while admitted to the hospital and is stable for discharge.

## 2024-12-12 NOTE — DISCHARGE NOTE PROVIDER - CARE PROVIDER_API CALL
Thomas Sharma  Orthopaedic Surgery  [address] [facility] [address] [place] [address]  Phone: [phone]  Fax: [phone]  Follow Up Time:

## 2024-12-12 NOTE — BRIEF OPERATIVE NOTE - NSICDXBRIEFPROCEDURE_GEN_ALL_CORE_FT
PROCEDURES:  Total left hip replacement 12-Dec-2024 13:34:39 direct anterior; CPT code 80458 Mohsen Fernández

## 2024-12-12 NOTE — DISCHARGE NOTE PROVIDER - NSDCMRMEDTOKEN_GEN_ALL_CORE_FT
aspirin 81 mg oral delayed release tablet: 1 tab(s) orally every 12 hours lower the risk of DVT for 30 days after Total Hip Arthroplasty  ibuprofen 400 mg oral tablet: 1 tab(s) orally every 8 hours post op pain  naloxone 4 mg/0.1 mL nasal spray: 1 spray(s) intranasally once as needed for od take as directed in the event of accidental overdose  pantoprazole 40 mg oral delayed release tablet: 1 tab(s) orally once a day (before a meal) GI prophylaxis for 30 days after Total Hip Arthroplasty  polyethylene glycol 3350 oral powder for reconstitution: 17 gram(s) orally once a day (at bedtime) as needed for  constipation  senna leaf extract oral tablet: 2 tab(s) orally once a day (at bedtime) while on pain medication to prevent constipation  tiZANidine 4 mg oral capsule: 1 cap(s) orally once a day (at bedtime)  traMADol 50 mg oral tablet: 1 tab(s) orally every 4 to 6 hours as needed for Severe Pain (7 - 10) MDD: 5

## 2024-12-12 NOTE — PHYSICAL THERAPY INITIAL EVALUATION ADULT - GENERAL OBSERVATIONS, REHAB EVAL
20:15-20:45. Chart reviewed; confirmed with RN to see the pt for PT. Pt ready for PT; received in chair with no complain of pain and in NAD. +hep lock, +Mepilex L hip. Agreeable for PT evaluation.

## 2024-12-12 NOTE — ASU PATIENT PROFILE, ADULT - NSICDXPASTMEDICALHX_GEN_ALL_CORE_FT
PAST MEDICAL HISTORY:  GERD (gastroesophageal reflux disease)     Left hip pain     Obesity with body mass index (BMI) of 30.0 to 39.9

## 2024-12-12 NOTE — PROGRESS NOTE ADULT - ASSESSMENT
doing well post op no complaints   pain controlled   moves extremities nvid   isaac and venous calf pumps in place     Vital Signs Last 24 Hrs  T(C): 36.1 (12 Dec 2024 17:30), Max: 36.5 (12 Dec 2024 14:12)  T(F): 97 (12 Dec 2024 17:30), Max: 97.7 (12 Dec 2024 14:12)  HR: 94 (12 Dec 2024 17:30) (76 - 94)  BP: 137/84 (12 Dec 2024 17:30) (131/74 - 164/91)  BP(mean): 96 (12 Dec 2024 14:12) (96 - 96)  RR: 18 (12 Dec 2024 17:30) (14 - 18)  SpO2: 98% (12 Dec 2024 17:30) (97% - 100%)    s/p left thr pod 0    pain control   dvt proph   am labs   pt ot   abx 25   dispo planning

## 2024-12-12 NOTE — BRIEF OPERATIVE NOTE - NSEVIDENCEINFORABS_GEN_ALL_CORE
Chief Complaint   Patient presents with   • Follow-up   • Blood Pressure     recently started the metoprolol    • Mass     lymph node right lower head for months       HISTORY OF PRESENT ILLNESS:   Be is a 36 year old female who comes today follow-up on hypertension and tachycardia.  Patient was started on metoprolol 75 mg daily.  Patient is tolerating the medication well.  Patient is noticing that it is helping her for the palpitations.  She denies any chest pain or shortness of breath or palpitations or lightheadedness.  .  She is cutting down on the sodas and increasing her hydration.  She used to drink \" mount dew\" that she stopped.      She is taking her vitamin-D supplements as she was very low in the blood work.  She is trying to is also in her diet.      She also has a concern in her neck as she noticed the lump a month ago.  She denies increasing in size or pain or discomfort.  She denies any trauma or neck injury.    Patient Active Problem List   Diagnosis   • Smoking addiction   • Chronic right-sided low back pain without sciatica   • Chronic thoracic back pain   • Tachycardia       Current Outpatient Medications   Medication Sig Dispense Refill   • cholecalciferol 1.25 mg (50,000 units) tablet Take 1 tablet by mouth 1 day a week. For 8 weeks, then go to over the counter vitamin D3 (2,000 units) once daily. 8 tablet 0   • metoPROLOL succinate (TOPROL-XL) 25 MG 24 hr tablet Take 1 tablet by mouth daily. 30 tablet 0     No current facility-administered medications for this visit.        Social History     Socioeconomic History   • Marital status:      Spouse name: Not on file   • Number of children: 2   • Years of education: Not on file   • Highest education level: Not on file   Occupational History   • Occupation:      Comment: part time   Social Needs   • Financial resource strain: Not on file   • Food insecurity     Worry: Not on file     Inability: Not on file   • Transportation  needs     Medical: Not on file     Non-medical: Not on file   Tobacco Use   • Smoking status: Current Every Day Smoker     Packs/day: 1.00     Years: 17.00     Pack years: 17.00     Types: Cigarettes   • Smokeless tobacco: Never Used   • Tobacco comment: has packet - on her to do list down the road as of 2/18   Substance and Sexual Activity   • Alcohol use: Yes     Alcohol/week: 0.0 - 1.0 standard drinks     Comment: ~occasionally   • Drug use: No   • Sexual activity: Yes     Partners: Male     Birth control/protection: Condom   Lifestyle   • Physical activity     Days per week: Not on file     Minutes per session: Not on file   • Stress: Not on file   Relationships   • Social connections     Talks on phone: Not on file     Gets together: Not on file     Attends Caodaism service: Not on file     Active member of club or organization: Not on file     Attends meetings of clubs or organizations: Not on file     Relationship status: Not on file   • Intimate partner violence     Fear of current or ex partner: Not on file     Emotionally abused: Not on file     Physically abused: Not on file     Forced sexual activity: Not on file   Other Topics Concern   •  Service No   • Blood Transfusions No   • Caffeine Concern Yes     Comment: 2 cans soda daily   • Occupational Exposure No   • Hobby Hazards No   • Sleep Concern No   • Stress Concern No   • Weight Concern No   • Special Diet No   • Back Care No   • Exercise Yes     Comment: active daily life   • Bike Helmet No   • Seat Belt Yes   • Self-Exams No   Social History Narrative   • Not on file         PHYSICAL EXAM:   Visit Vitals  /82 (BP Location: LUE - Left upper extremity, Patient Position: Sitting, Cuff Size: Regular)   Pulse 92   Temp 97.9 °F (36.6 °C) (Temporal)   Wt 55.7 kg   LMP 02/11/2021 (Exact Date)   BMI 20.45 kg/m²     General: Well developed. Well nourished. In no apparent distress.    Eyes:  EOMI (extraocular movements are intact).  Sclerae  anicteric.    HENT: Normocephalic. Atraumatic.  Neck: Supple.  Respiratory: Normal respiratory effort. Symmetrical chest expansion. Lungs clear to auscultation bilaterally. No wheezes. No rhonchi. No rales. No rubs.  Cardiovascular:  Regular rate . No murmurs, rubs, or gallops. Normal S1 and S2. No S3 or S4. No JVD (jugular vein distention). No carotid bruits.  Musculoskeletal: No clubbing or cyanosis.  Neurologic: Alert and oriented x 3.   Integumentary: Warm. Dry. Pink. No rashes . Pea size lump in the right occipital area; not tender or warm to touch.  Lymphatic: No lymphadenopathy in submental, submandibular or cervical chain. No supraclavicular or infraclavicular lymphadenopathy.    Psychiatric: Cooperative. Appropriate mood .        ASSESSMENT/PLAN  1. Essential hypertension  Improved on metoprolol XL  Continue same management  Continue low-salt diet and increase activity as tolerated      2. Tachycardia  Improved on metoprolol, continue same dose  Limit caffeine intake through diet    3. Lump in neck  Monitor for now  Discussed differential  Address if any increase in size or discomfort or pain       4. Vitamin-D deficiency  Patient is aware to continue her vitamin-D supplement  Increase vitamin-D intake through diet  Will check levels in 6 months        I spent 30 minutes total for this visit with more than 50% in counseling and coordination of care for this patient   No

## 2024-12-12 NOTE — ASU PATIENT PROFILE, ADULT - FALL HARM RISK - RISK INTERVENTIONS

## 2024-12-12 NOTE — PATIENT PROFILE ADULT - FALL HARM RISK - HARM RISK INTERVENTIONS
Assistance with ambulation/Assistance OOB with selected safe patient handling equipment/Communicate Risk of Fall with Harm to all staff/Discuss with provider need for PT consult/Monitor gait and stability/Provide patient with walking aids - walker, cane, crutches/Reinforce activity limits and safety measures with patient and family/Sit up slowly, dangle for a short time, stand at bedside before walking/Tailored Fall Risk Interventions/Use of alarms - bed, chair and/or voice tab/Visual Cue: Yellow wristband and red socks/Bed in lowest position, wheels locked, appropriate side rails in place/Call bell, personal items and telephone in reach/Instruct patient to call for assistance before getting out of bed or chair/Non-slip footwear when patient is out of bed/[place] to call system/Physically safe environment - no spills, clutter or unnecessary equipment/Purposeful Proactive Rounding/Room/bathroom lighting operational, light cord in reach

## 2024-12-12 NOTE — PATIENT PROFILE ADULT - FUNCTIONAL ASSESSMENT - BASIC MOBILITY 6.
3-calculated by average /Not able to assess (calculate score using [facility] averaging method)

## 2024-12-12 NOTE — PHYSICAL THERAPY INITIAL EVALUATION ADULT - LIVES WITH, PROFILE
lives with family on the 2nd floor apartment with an elevator in the building and ~ 7 steps to enter with bilateral handrails or a ramp to enter also./children/spouse

## 2024-12-12 NOTE — ASU PATIENT PROFILE, ADULT - TEACHING/LEARNING FACTORS INFLUENCE READINESS TO LEARN
Scheduling Information  To schedule your CT/MRI scan, please contact [facility] at [phone] OR [facility] at [phone]    To schedule your Surgery, please contact our Specialty Schedulers at [phone]      ENT Clinic Locations Clinic Hours Telephone Number     [facility]  [address] [place] [address]   Monday:           1:00pm -- 5:00pm    Friday:              8:00am - 12:00pm   To schedule/reschedule an appointment with   Dr. Shah,   please contact our   Specialty Scheduling Department at:     [phone]       [facility]  [address] [address] Tuesday:          8:00am -- 2:00pm         Urgent Care Locations Clinic Hours Telephone Numbers     [facility]  [address] [place] [address]     Monday-Friday:     11:00am - 9:00pm    Saturday-Sunday:  9:00am - 5:00pm   [phone]     [facility]  [address] [place] [address]     Monday-Friday:      5:00pm - 9:00pm     Saturday-Sunday:  9:00am - 5:00pm   [phone]          motivation to learn

## 2024-12-13 ENCOUNTER — TRANSCRIPTION ENCOUNTER (OUTPATIENT)
Age: 54
End: 2024-12-13

## 2024-12-13 VITALS
RESPIRATION RATE: 18 BRPM | HEART RATE: 84 BPM | TEMPERATURE: 98 F | DIASTOLIC BLOOD PRESSURE: 78 MMHG | SYSTOLIC BLOOD PRESSURE: 147 MMHG | OXYGEN SATURATION: 99 %

## 2024-12-13 PROBLEM — M25.552 PAIN IN LEFT HIP: Chronic | Status: ACTIVE | Noted: 2024-12-03

## 2024-12-13 LAB
ANION GAP SERPL CALC-SCNC: 11 MMOL/L — SIGNIFICANT CHANGE UP (ref 7–14)
BUN SERPL-MCNC: 13 MG/DL — SIGNIFICANT CHANGE UP (ref 10–20)
CALCIUM SERPL-MCNC: 8.7 MG/DL — SIGNIFICANT CHANGE UP (ref 8.4–10.5)
CHLORIDE SERPL-SCNC: 99 MMOL/L — SIGNIFICANT CHANGE UP (ref 98–110)
CO2 SERPL-SCNC: 24 MMOL/L — SIGNIFICANT CHANGE UP (ref 17–32)
CREAT SERPL-MCNC: 1.2 MG/DL — SIGNIFICANT CHANGE UP (ref 0.7–1.5)
EGFR: 72 ML/MIN/1.73M2 — SIGNIFICANT CHANGE UP
GLUCOSE SERPL-MCNC: 173 MG/DL — HIGH (ref 70–99)
HCT VFR BLD CALC: 36.2 % — LOW (ref 42–52)
HGB BLD-MCNC: 12.3 G/DL — LOW (ref 14–18)
MCHC RBC-ENTMCNC: 29.4 PG — SIGNIFICANT CHANGE UP (ref 27–31)
MCHC RBC-ENTMCNC: 34 G/DL — SIGNIFICANT CHANGE UP (ref 32–37)
MCV RBC AUTO: 86.6 FL — SIGNIFICANT CHANGE UP (ref 80–94)
NRBC # BLD: 0 /100 WBCS — SIGNIFICANT CHANGE UP (ref 0–0)
PLATELET # BLD AUTO: 205 K/UL — SIGNIFICANT CHANGE UP (ref 130–400)
PMV BLD: 8.8 FL — SIGNIFICANT CHANGE UP (ref 7.4–10.4)
POTASSIUM SERPL-MCNC: 4.1 MMOL/L — SIGNIFICANT CHANGE UP (ref 3.5–5)
POTASSIUM SERPL-SCNC: 4.1 MMOL/L — SIGNIFICANT CHANGE UP (ref 3.5–5)
RBC # BLD: 4.18 M/UL — LOW (ref 4.7–6.1)
RBC # FLD: 13.2 % — SIGNIFICANT CHANGE UP (ref 11.5–14.5)
SODIUM SERPL-SCNC: 134 MMOL/L — LOW (ref 135–146)
WBC # BLD: 15.83 K/UL — HIGH (ref 4.8–10.8)
WBC # FLD AUTO: 15.83 K/UL — HIGH (ref 4.8–10.8)

## 2024-12-13 PROCEDURE — 99222 1ST HOSP IP/OBS MODERATE 55: CPT

## 2024-12-13 RX ADMIN — KETOROLAC TROMETHAMINE 15 MILLIGRAM(S): 30 INJECTION INTRAMUSCULAR; INTRAVENOUS at 00:00

## 2024-12-13 RX ADMIN — KETOROLAC TROMETHAMINE 15 MILLIGRAM(S): 30 INJECTION INTRAMUSCULAR; INTRAVENOUS at 05:01

## 2024-12-13 RX ADMIN — PANTOPRAZOLE SODIUM 40 MILLIGRAM(S): 40 TABLET, DELAYED RELEASE ORAL at 05:00

## 2024-12-13 RX ADMIN — Medication 200 MILLIGRAM(S): at 05:00

## 2024-12-13 RX ADMIN — KETOROLAC TROMETHAMINE 15 MILLIGRAM(S): 30 INJECTION INTRAMUSCULAR; INTRAVENOUS at 11:50

## 2024-12-13 RX ADMIN — KETOROLAC TROMETHAMINE 15 MILLIGRAM(S): 30 INJECTION INTRAMUSCULAR; INTRAVENOUS at 05:50

## 2024-12-13 RX ADMIN — Medication 81 MILLIGRAM(S): at 05:00

## 2024-12-13 NOTE — DISCHARGE NOTE NURSING/CASE MANAGEMENT/SOCIAL WORK - NSDCPEFALRISK_GEN_ALL_CORE
For information on Fall & Injury Prevention, visit: [email or web address] OR  [email or web address] OR  https://www.cdc.gov/steadi/patient.html

## 2024-12-13 NOTE — CONSULT NOTE ADULT - ASSESSMENT
Mr Napoles is a 54 YOM w a PMH of obesity (class II) and left hip pain presented to the hospital for scheduled orthopedic surgery. Patient denies: HA, nausea, diarrhea, dizziness, syncope/near syncope, change in vision or hearing, dysphagia or cough, sob/cp. Patient appears vitally stable and reports PAIN IS NOT CONTROLLED BY TRAMADOL      #S/P Left THR   #Post Op day 1  #PAIN UNCONTROLLED  #Nausea, mild  patient reporting pain not controlled today, particularly with ambulation  patient reports TRAMADOL HAS BEEN INEFFECTIVE  -recommend Dilaudid 2mg PO q8hr PRN for severe pain  -Tylenol 975mg PO q 8hr PRN     -PT  -recommend taking a pain medication 45min-1hr prior to PT   -out patient weight loss recommended  -zofran PRN  for 3 days provided no QTc prolongation     Patient medically stable to DC. IM signing off, please recontact PRN          #Misc  - DVT Prophylaxis:  - GI Prophylaxis:  - Diet:  - Activity:  - IV Fluids:  - Code Status:    Dispo:

## 2024-12-13 NOTE — OCCUPATIONAL THERAPY INITIAL EVALUATION ADULT - BED MOBILITY/TRANSFERS, PREVIOUS LEVEL OF FUNCTION, OT EVAL
Subsequent Progress Note  Patient: Monique Vega  YOB: 1971  MRN: [id number]    Chief Complaint: CHF SOB  Chief Complaint   Patient presents with    Follow-up    Congestive Heart Failure    Cardiomyopathy    Hypertension       CV Data:  9/2020 Echo EF 20   9/3/2020 Cath normal CORS EF 20 EDP 9  12/10/2020 Echo EF 30   12/22/20 Dual Chamber ICD  11/21 Echo Ef 55    Subjective/HPI: little SOB. NO edema. No dizzy. No bleed no falls takes meds. Recent new DX of Acute SHF EF 20    11/11/2020 no cp no sob little edema. Taking too much fluids and salt. Take smeds. 12/10/2020 feels well. Gained some weight. No has active no cp no bleed    1/22/21 no cp no sob no falls no bleed takes meds. She has bloating after salty diet. ICD site is infected saw Dr. Sara Nielson this am    6/4/21 gained weight. Drinking sugary pos every day. No cp no sob     9/10/21 did not get echo doen due to death in family. No cp  No sob no falls no bleed. 12/20/21 recent 2 er visits for SOB and told it was anxiety related and was given Ativan. She has felt some bloating in last few months. There were 4 times when she took extra Lasix . 5/17/22 gained 11LBS. No cp no sob now Dx with Psoriatic Arthirits. No bleed no falls. 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      EKG: SR 90    Past Medical History:   Diagnosis Date    Adult ADHD     Anxiety     Bipolar 1 disorder (HCC)     CHF (congestive heart failure) (HCC)     Depression     Hypertension     Hypothyroidism        Past Surgical History:   Procedure Laterality Date    CARDIAC DEFIBRILLATOR PLACEMENT  12/22/2020    Dr. Jodelle Harada      x4    CHOLECYSTECTOMY      COLONOSCOPY      DIAGNOSTIC CARDIAC CATH LAB PROCEDURE  09/02/2020    GASTRIC BYPASS SURGERY  2006       Family History   Problem Relation Age of Onset    High Blood Pressure Mother        Social History     Socioeconomic History    Marital
status: Legally      Spouse name: None    Number of children: None    Years of education: None    Highest education level: None   Occupational History    None   Tobacco Use    Smoking status: Never Smoker    Smokeless tobacco: Never Used   Substance and Sexual Activity    Alcohol use: No    Drug use: No    Sexual activity: None   Other Topics Concern    None   Social History Narrative    None     Social Determinants of Health     Financial Resource Strain: Low Risk     Difficulty of Paying Living Expenses: Not hard at all   Food Insecurity: No Food Insecurity    Worried About Running Out of Food in the Last Year: Never true    [address] in the Last Year: Never true   Transportation Needs:     Lack of Transportation (Medical): Not on file    Lack of Transportation (Non-Medical):  Not on file   Physical Activity:     Days of Exercise per Week: Not on file    Minutes of Exercise per Session: Not on file   Stress:     Feeling of Stress : Not on file   Social Connections:     Frequency of Communication with Friends and Family: Not on file    Frequency of Social Gatherings with Friends and Family: Not on file    Attends Mu-ism Services: Not on file    Active Member of [address] [place] [address] or Organizations: Not on file    Attends Club or Organization Meetings: Not on file    Marital Status: Not on file   Intimate Partner Violence:     Fear of Current or Ex-Partner: Not on file    Emotionally Abused: Not on file    Physically Abused: Not on file    Sexually Abused: Not on file   Housing Stability:     Unable to Pay for Housing in the Last Year: Not on file    Number of [place] in the Last Year: Not on file    Unstable Housing in the Last Year: Not on file       Allergies   Allergen Reactions    Ibuprofen Nausea And Vomiting    Nsaids     Tramadol Nausea And Vomiting    Trazodone Nausea And Vomiting    Vistaril [Hydroxyzine Hcl] Palpitations       Current Outpatient Medications
Medication Sig Dispense Refill    ENTRESTO 24-26 MG per tablet Take 1 tablet by mouth twice daily 60 tablet 11    nystatin (MYCOSTATIN) 249655 UNIT/GM cream APPLY CREAM TOPICALLY TO AFFECTED AREA TWICE DAILY TO RASH AS NEEDED      divalproex (DEPAKOTE ER) 250 MG extended release tablet       potassium chloride (KLOR-CON M) 20 MEQ extended release tablet Take 1 tablet by mouth once daily with breakfast 90 tablet 0    SYNTHROID 150 MCG tablet Take 1 tablet by mouth Daily 90 tablet 3    glycopyrrolate (ROBINUL) 1 MG tablet Take 1 tablet by mouth 2 times daily 60 tablet 3    tretinoin (RETIN-A) 0.05 % cream Apply a thin layer once daily at night (Photosensitivity) + daily sunscreen; Keep away from eyes, mouth, nasal creases and mucous membranes 1 each 2    amphetamine-dextroamphetamine (ADDERALL) 20 MG tablet TAKE 1 TABLET BY MOUTH THREE TIMES DAILY      NARCAN 4 MG/0.1ML LIQD nasal spray       carvedilol (COREG) 12.5 MG tablet Take 1 tablet by mouth twice daily 60 tablet 0    azelaic acid (AZELEX) 20 % cream After skin is washed and patted dry, apply a thin film of cream to affected area twice daily, morning and evening. 1 Tube 0    Azelaic Acid 15 % GEL Apply a thin layer to the affected area(s) two times a day, in the morning and evening 1 Tube 1    fluocinonide (LIDEX) 0.05 % ointment APPLY TO AFFECTED AREAS TWICE DAILY MONDAY THROUGH FRIDAY AVOID FACE AND GROIN      tazarotene (AVAGE) 0.1 % cream APPLY TO AFFECTED AREAS AT BEDTIME AS TOLERATED      traZODone (DESYREL) 100 MG tablet TAKE 1 TABLET BY MOUTH ONCE DAILY AT BEDTIME      VRAYLAR 6 MG CAPS capsule TAKE 1 CAPSULE BY MOUTH ONCE DAILY      fluvoxaMINE (LUVOX) 50 MG tablet TAKE 1 TABLET BY MOUTH ONCE DAILY AT BEDTIME      Azelaic Acid 15 % GEL Apply a thin layer of gel to the affected skin. Gently and thoroughly massage it into the skin. Use 1-2x daily.  1 Tube 2    furosemide (LASIX) 40 MG tablet Take one tablet by mouth once daily 30
tablet 11    HYDROcodone-acetaminophen (NORCO) 5-325 MG per tablet TAKE 1 TABLET BY MOUTH EVERY 6 HOURS AS NEEDED      cephALEXin (KEFLEX) 500 MG capsule TAKE 1 CAPSULE BY MOUTH EVERY 8 HOURS      LORazepam (ATIVAN) 1 MG tablet TAKE 1 TABLET BY MOUTH TWICE DAILY AS NEEDED      melatonin 3 MG TABS tablet Take 3 mg by mouth daily      tacrolimus (PROTOPIC) 0.1 % ointment Apply topically 2 times daily       pantoprazole (PROTONIX) 40 MG tablet Take 1 tablet by mouth every morning (before breakfast) 30 tablet 3    ferrous sulfate (IRON 325) 325 (65 Fe) MG tablet Take 1 tablet by mouth 2 times daily 60 tablet 3    albuterol sulfate HFA (PROAIR HFA) 108 (90 Base) MCG/ACT inhaler Use every 4 hours while awake for 7-10 days then PRN wheezing  Dispense with SPACER and Instruct on use. May sub Ventolin or Proventil as needed per [employer]. (Patient taking differently: Inhale 1 puff into the lungs every 4 hours as needed Use every 4 hours while awake for 7-10 days then PRN wheezing  Dispense with SPACER and Instruct on use. May sub Ventolin or Proventil as needed per Insurance.) 1 Inhaler 1    pregabalin (LYRICA) 225 MG capsule Take 225 mg by mouth 2 times daily. No current facility-administered medications for this visit. Review of Systems:   Review of Systems   Constitutional: Negative. Negative for diaphoresis and fatigue. HENT: Negative. Eyes: Negative. Respiratory: Negative for cough, chest tightness, wheezing and stridor. Cardiovascular: Negative. Negative for chest pain, palpitations and leg swelling. Gastrointestinal: Negative. Negative for blood in stool and nausea. Genitourinary: Negative. Musculoskeletal: Negative. Skin: Negative. Neurological: Negative. Negative for dizziness, syncope, weakness and light-headedness. Hematological: Negative. Psychiatric/Behavioral: Negative.           Physical Examination:    /72 (Site: Left Upper Arm, Position: Sitting,
Cuff Size: Large Adult)   Pulse 95   Resp 18   Ht 5' 8\" (1.727 m)   Wt 231 lb (104.8 kg)   SpO2 96%   BMI 35.12 kg/m²    Physical Exam   Constitutional: She appears healthy. No distress. HENT:   Normal cephalic and Atraumatic   Eyes: Pupils are equal, round, and reactive to light. Neck: Thyroid normal. No JVD present. No neck adenopathy. No thyromegaly present. Cardiovascular: Normal rate, regular rhythm, normal heart sounds, intact distal pulses and normal pulses. Pulmonary/Chest: Effort normal and breath sounds normal. She has no wheezes. She has no rales. She exhibits no tenderness. Abdominal: Soft. Bowel sounds are normal. There is no abdominal tenderness. Musculoskeletal:         General: No tenderness or edema. Normal range of motion. Cervical back: Normal range of motion and neck supple. Neurological: She is alert and oriented to person, place, and time. Skin: Skin is warm. No cyanosis. Nails show no clubbing.        LABS:  CBC:   Lab Results   Component Value Date    WBC 5.7 12/05/2021    RBC 4.21 12/05/2021    RBC 4.67 10/09/2011    HGB 13.1 12/05/2021    HCT 40.4 12/05/2021    MCV 96.0 12/05/2021    MCH 31.1 12/05/2021    MCHC 32.4 12/05/2021    RDW 12.8 12/05/2021     12/05/2021    MPV 9.3 07/07/2014     Lipids:  Lab Results   Component Value Date    CHOL 202 (H) 11/06/2020     Lab Results   Component Value Date    TRIG 110 11/06/2020     Lab Results   Component Value Date    HDL 81 (H) 11/06/2020     Lab Results   Component Value Date    LDLCALC 99 11/06/2020     No results found for: LABVLDL, VLDL  No results found for: CHOLHDLRATIO  CMP:    Lab Results   Component Value Date     12/05/2021    K 4.2 12/05/2021    K 3.9 09/02/2020     12/05/2021    CO2 20 12/05/2021    BUN 13 12/05/2021    CREATININE 0.80 12/05/2021    GFRAA >60.0 12/05/2021    LABGLOM >60.0 12/05/2021    GLUCOSE 117 12/05/2021    GLUCOSE 89 10/09/2011    PROT 7.2 09/12/2021    LABALBU 4.1
09/12/2021    LABALBU 4.9 10/09/2011    CALCIUM 9.3 12/05/2021    BILITOT <0.2 09/12/2021    ALKPHOS 118 09/12/2021    AST 27 09/12/2021    ALT 24 09/12/2021     BMP:    Lab Results   Component Value Date     12/05/2021    K 4.2 12/05/2021    K 3.9 09/02/2020     12/05/2021    CO2 20 12/05/2021    BUN 13 12/05/2021    LABALBU 4.1 09/12/2021    LABALBU 4.9 10/09/2011    CREATININE 0.80 12/05/2021    CALCIUM 9.3 12/05/2021    GFRAA >60.0 12/05/2021    LABGLOM >60.0 12/05/2021    GLUCOSE 117 12/05/2021    GLUCOSE 89 10/09/2011     Magnesium:    Lab Results   Component Value Date    MG 2.2 09/12/2021     TSH:  Lab Results   Component Value Date    TSH 2.170 01/31/2022       Patient Active Problem List   Diagnosis    Bipolar [facility] disorder, depressed [facility]    Seizure disorder ([facility]    Hypothyroidism due to Hashimoto's thyroiditis    Bipolar affective disorder, current episode hypomanic ([address]    CHF (congestive heart failure), NYHA class I, acute on chronic, combined ([facility]    Essential hypertension    NICM (nonischemic cardiomyopathy) ([facility]    Encounter for implantable cardioverter-defibrillator discussion       There are no discontinued medications. Modified Medications    No medications on file       No orders of the defined types were placed in this encounter. Assessment/Plan:    1. CHF (congestive heart failure), NYHA class I, acute on chronic, combined ([address] - compensated. Continue CV meds and low salt diet. 2. Essential hypertension stable   stable on meds. Continue same. Low salt diet. 3. NICM (nonischemic cardiomyopathy) ([address]      Labs reviewed. 4. Hyperthyroid- refer to Endocrine. 5. Loose weight     Need f/u Echo - EF 55%    6. ? Psoraitic arthritis- sees Rheum at [facility]. Counseling:  Heart Healthy Lifestyle, Improve BMI, Low Salt Diet, Take Precautions to Prevent Falls and Walk Daily    Return in about 4 months (around 9/17/2022).       Electronically signed by Christophe Ledezma
MD CARLIN on 5/17/2022 at 4:15 PM
1
sc/independent/needs device

## 2024-12-13 NOTE — OCCUPATIONAL THERAPY INITIAL EVALUATION ADULT - LIVES WITH, PROFILE
-- DO NOT REPLY / DO NOT REPLY ALL --  -- Message is from Engagement Center Operations (ECO) --    ONLY TO BE USED WITHIN A REFILL MEDICATION ENCOUNTER    Med Refill  Is the patient currently having any symptoms?: No/Non-Emergent symptoms    Name of medication requested: See pended med    Has patient contacted the pharmacy? Yes    Is this the first request for the medication in the last 48 hours?: Yes    Patient is requesting a medication refill - medication is on active medication list    Patient is currently OUT of the requested medication - sent as HIGH priority      Full name of the provider who ordered the medication: Dr. souleymane Stokes    [facility] site name / Account # for provider: [address] [place] [address]     Preferred Pharmacy: Pharmacy  [id number] - [place] [address] [facility] - [address]    Patient confirmed the above pharmacy as correct?  Yes      Caller Information       Type Contact Phone/Fax    11/22/2022 11:10 AM CST Phone (Incoming) KIKI ROBERTO (Father) [phone] (M)          Alternative phone number: n/a     Can a detailed message be left?: Yes    Patient is completely out of medication: Verify if patient is currently experiencing symptoms. If patient is symptomatic, proceed with front end triage instead of medication refill. If patient is not symptomatic but is completely out of medication, eve as High priority when routing. Inform patient: “Please call back with any questions or concerns and if your condition becomes life threatening, you should seek immediate medical assistance by calling 911 or going to the Emergency Department for evaluation.”    Inform all patients: \"If the clinical team needs to contact you regarding this refill, please be aware the return phone call may come from an unidentified or out of state phone number and your refill request will be addressed as soon as the clinical team reviews your message.\"   wife and children in a 2nd floor apartment +7 steps to enter with (B) handrails or ramp to enter +elevator access inside +tub +grab bars +standard toilet/children/spouse

## 2024-12-13 NOTE — DISCHARGE NOTE NURSING/CASE MANAGEMENT/SOCIAL WORK - PATIENT PORTAL LINK FT
You can access the FollowMyHealth Patient Portal offered by [facility] by registering at the following website: [email or web address]. By joining [facility]’s FollowMyHealth portal, you will also be able to view your health information using other applications (apps) compatible with our system.

## 2024-12-13 NOTE — OCCUPATIONAL THERAPY INITIAL EVALUATION ADULT - LEVEL OF INDEPENDENCE: TUB, REHAB EVAL
Pt reported 3/10 (L) hip pain. Pt educated on sequencing for safe tub transfer. Pt advised to sponge bathe and to practice tub transfer prior to performing independently to increase safety. Pt demonstrated good understanding and in agreement.

## 2024-12-13 NOTE — DISCHARGE NOTE NURSING/CASE MANAGEMENT/SOCIAL WORK - FINANCIAL ASSISTANCE
[facility] provides services at a reduced cost to those who are determined to be eligible through [facility]’s financial assistance program. Information regarding [facility]’s financial assistance program can be found by going to [email or web address] or by calling [phone].

## 2024-12-13 NOTE — PROGRESS NOTE ADULT - SUBJECTIVE AND OBJECTIVE BOX
54 M s/p left krystal, pod 1    MEDICATIONS  (STANDING):  aspirin enteric coated 81 milliGRAM(s) Oral every 12 hours  celecoxib 200 milliGRAM(s) Oral every 12 hours  chlorhexidine 2% Cloths 1 Application(s) Topical <User Schedule>  dexAMETHasone     Tablet 4 milliGRAM(s) Oral once  influenza   Vaccine 0.5 milliLiter(s) IntraMuscular once  ketorolac   Injectable 15 milliGRAM(s) IV Push every 6 hours  pantoprazole    Tablet 40 milliGRAM(s) Oral before breakfast  polyethylene glycol 3350 17 Gram(s) Oral at bedtime  senna 2 Tablet(s) Oral at bedtime  sodium chloride 0.9%. 1000 milliLiter(s) (100 mL/Hr) IV Continuous <Continuous>    MEDICATIONS  (PRN):  aluminum hydroxide/magnesium hydroxide/simethicone Suspension 30 milliLiter(s) Oral four times a day PRN Indigestion  magnesium hydroxide Suspension 30 milliLiter(s) Oral daily PRN Constipation  ondansetron Injectable 4 milliGRAM(s) IV Push every 6 hours PRN Nausea and/or Vomiting  traMADol 50 milliGRAM(s) Oral every 4 hours PRN Severe Pain (7 - 10)      Pt s/e at bedside, pain is well controlled with pain meds, no other complaints, walked in am, no problems with urination    NAD    Vital Signs Last 24 Hrs  T(C): 36.8 (13 Dec 2024 04:08), Max: 36.8 (13 Dec 2024 00:02)  T(F): 98.3 (13 Dec 2024 04:08), Max: 98.3 (13 Dec 2024 04:08)  HR: 76 (13 Dec 2024 04:08) (76 - 94)  BP: 119/73 (13 Dec 2024 04:08) (119/73 - 164/91)  BP(mean): 96 (12 Dec 2024 14:12) (96 - 96)  RR: 18 (13 Dec 2024 04:08) (14 - 18)  SpO2: 96% (13 Dec 2024 04:08) (95% - 100%)    Parameters below as of 12 Dec 2024 14:22  Patient On (Oxygen Delivery Method): room air    PE:  dressing d/c/i  compartments soft  NVID  calf soft, NT  foot wwp    am labs pending    PT/OT, wbat with walker  pain control  DVT/GI ppx  am labs  postop abx completed  discharge planning

## 2024-12-13 NOTE — OCCUPATIONAL THERAPY INITIAL EVALUATION ADULT - GENERAL OBSERVATIONS, REHAB EVAL
10:30-11:00; chart reviewed, ok to treat by Occupational Therapist as confirmed by RN Annalisa, Pt received seated in ASU recliner +mepilex dressing (L) hip +LUE heplock in NAD. Pt reported 3/10 (L) hip pain; RN aware. Pt in agreement with OT IE.

## 2024-12-13 NOTE — CONSULT NOTE ADULT - SUBJECTIVE AND OBJECTIVE BOX
MALKA TAPIA 54y Male  MRN#: [id number]   Hospital Day: 1d    SUBJECTIVE  Patient is a 54y old Male who presents with a chief complaint of Total Hip Arthroplasty  (12 Dec 2024 16:34)  Currently admitted to medicine with the primary diagnosis of Arthritis of left hip      INTERVAL HPI AND OVERNIGHT EVENTS:  Patient was examined and seen at bedside. This morning he is resting comfortably in chair at bedside doing leg/knee exercises w a towel and reports MILD NAUSEA this AM     REVIEW OF SYMPTOMS:  CONSTITUTIONAL: No weakness, fevers or chills; No headaches  EYES: No visual changes, eye pain, or discharge  ENT: No vertigo; No ear pain or change in hearing; No sore throat or difficulty swallowing  NECK: No pain or stiffness  RESPIRATORY: No cough, wheezing, or hemoptysis; No shortness of breath  CARDIOVASCULAR: No chest pain or palpitations  GASTROINTESTINAL: No abdominal or epigastric pain; No nausea, vomiting, or hematemesis; No diarrhea or constipation; No melena or hematochezia  GENITOURINARY: No dysuria, frequency or hematuria  MUSCULOSKELETAL: today has joint stiffness and swelling, pain NOT CONTROLLED BY TRAMADOL   NEUROLOGICAL: No numbness or weakness  SKIN: No itching or rashes    OBJECTIVE  PAST MEDICAL & SURGICAL HISTORY  GERD (gastroesophageal reflux disease)    Left hip pain    Obesity with body mass index (BMI) of 30.0 to 39.9    Known health problems: none      ALLERGIES:  No Known Allergies    MEDICATIONS:  STANDING MEDICATIONS  aspirin enteric coated 81 milliGRAM(s) Oral every 12 hours  celecoxib 200 milliGRAM(s) Oral every 12 hours  chlorhexidine 2% Cloths 1 Application(s) Topical <User Schedule>  dexAMETHasone     Tablet 4 milliGRAM(s) Oral once  influenza   Vaccine 0.5 milliLiter(s) IntraMuscular once  ketorolac   Injectable 15 milliGRAM(s) IV Push every 6 hours  pantoprazole    Tablet 40 milliGRAM(s) Oral before breakfast  polyethylene glycol 3350 17 Gram(s) Oral at bedtime  senna 2 Tablet(s) Oral at bedtime  sodium chloride 0.9%. 1000 milliLiter(s) IV Continuous <Continuous>    PRN MEDICATIONS  aluminum hydroxide/magnesium hydroxide/simethicone Suspension 30 milliLiter(s) Oral four times a day PRN  magnesium hydroxide Suspension 30 milliLiter(s) Oral daily PRN  ondansetron Injectable 4 milliGRAM(s) IV Push every 6 hours PRN  traMADol 50 milliGRAM(s) Oral every 4 hours PRN      VITAL SIGNS: Last 24 Hours  T(C): 36.8 (13 Dec 2024 08:00), Max: 36.8 (13 Dec 2024 00:02)  T(F): 98.3 (13 Dec 2024 08:00), Max: 98.3 (13 Dec 2024 04:08)  HR: 70 (13 Dec 2024 08:00) (70 - 94)  BP: 127/76 (13 Dec 2024 08:00) (119/73 - 164/91)  BP(mean): 96 (12 Dec 2024 14:12) (96 - 96)  RR: 18 (13 Dec 2024 08:00) (14 - 18)  SpO2: 98% (13 Dec 2024 08:00) (95% - 100%)    LABS:                        12.3   15.83 )-----------( 205      ( 13 Dec 2024 06:52 )             36.2     12-13    134[L]  |  99  |  13  ----------------------------<  173[H]  4.1   |  24  |  1.2    Ca    8.7      13 Dec 2024 06:52        Urinalysis Basic - ( 13 Dec 2024 06:52 )    Color: x / Appearance: x / SG: x / pH: x  Gluc: 173 mg/dL / Ketone: x  / Bili: x / Urobili: x   Blood: x / Protein: x / Nitrite: x   Leuk Esterase: x / RBC: x / WBC x   Sq Epi: x / Non Sq Epi: x / Bacteria: x                RADIOLOGY:      PHYSICAL EXAM:  CONSTITUTIONAL: No acute distress, class II obesity, well-groomed, AAOx3  HEAD: Atraumatic, normocephalic  EYES: PERRLA, conjunctiva and sclera clear  ENT: Supple, no masses, no thyromegaly, no bruits, no JVD; moist mucous membranes  PULMONARY: Clear to auscultation bilaterally; no wheezes, rales, or rhonchi  CARDIOVASCULAR: Regular rate and rhythm; no murmurs, rubs, or gallops  GASTROINTESTINAL: Soft, non-tender, non-distended; bowel sounds present  MUSCULOSKELETAL: 2+ peripheral pulses; mild non fluctuant swelling w/O erythema or petechia of left knee, mild tenderness to touch. able to tolerated active and passive ROM   NEUROLOGY: non-focal  SKIN: No rashes or lesions; warm and dry

## 2024-12-16 LAB — SURGICAL PATHOLOGY STUDY: SIGNIFICANT CHANGE UP

## 2024-12-19 DIAGNOSIS — M16.12 UNILATERAL PRIMARY OSTEOARTHRITIS, LEFT HIP: ICD-10-CM

## 2024-12-19 DIAGNOSIS — E66.9 OBESITY, UNSPECIFIED: ICD-10-CM

## 2024-12-19 DIAGNOSIS — K21.9 GASTRO-ESOPHAGEAL REFLUX DISEASE WITHOUT ESOPHAGITIS: ICD-10-CM

## 2024-12-27 ENCOUNTER — APPOINTMENT (OUTPATIENT)
Dept: ORTHOPEDIC SURGERY | Facility: CLINIC | Age: 54
End: 2024-12-27

## 2025-01-02 ENCOUNTER — APPOINTMENT (OUTPATIENT)
Dept: ORTHOPEDIC SURGERY | Facility: CLINIC | Age: 55
End: 2025-01-02
Payer: COMMERCIAL

## 2025-01-02 ENCOUNTER — NON-APPOINTMENT (OUTPATIENT)
Age: 55
End: 2025-01-02

## 2025-01-02 DIAGNOSIS — M16.12 UNILATERAL PRIMARY OSTEOARTHRITIS, LEFT HIP: ICD-10-CM

## 2025-01-02 PROBLEM — E66.9 OBESITY, UNSPECIFIED: Chronic | Status: ACTIVE | Noted: 2024-12-05

## 2025-01-02 PROCEDURE — 73503 X-RAY EXAM HIP UNI 4/> VIEWS: CPT | Mod: LT

## 2025-01-02 PROCEDURE — 99024 POSTOP FOLLOW-UP VISIT: CPT

## 2025-02-11 ENCOUNTER — APPOINTMENT (OUTPATIENT)
Dept: ORTHOPEDIC SURGERY | Facility: CLINIC | Age: 55
End: 2025-02-11
Payer: COMMERCIAL

## 2025-02-11 DIAGNOSIS — M16.12 UNILATERAL PRIMARY OSTEOARTHRITIS, LEFT HIP: ICD-10-CM

## 2025-02-11 PROCEDURE — 99024 POSTOP FOLLOW-UP VISIT: CPT

## 2025-05-13 ENCOUNTER — APPOINTMENT (OUTPATIENT)
Dept: ORTHOPEDIC SURGERY | Facility: CLINIC | Age: 55
End: 2025-05-13

## 2025-06-08 ENCOUNTER — EMERGENCY (EMERGENCY)
Facility: HOSPITAL | Age: 55
LOS: 0 days | Discharge: HOME | End: 2025-06-08
Attending: STUDENT IN AN ORGANIZED HEALTH CARE EDUCATION/TRAINING PROGRAM
Payer: COMMERCIAL

## 2025-06-08 VITALS — HEART RATE: 96 BPM

## 2025-06-08 VITALS
TEMPERATURE: 98 F | HEART RATE: 106 BPM | RESPIRATION RATE: 16 BRPM | OXYGEN SATURATION: 99 % | SYSTOLIC BLOOD PRESSURE: 143 MMHG | DIASTOLIC BLOOD PRESSURE: 90 MMHG

## 2025-06-08 DIAGNOSIS — R05.1 ACUTE COUGH: ICD-10-CM

## 2025-06-08 DIAGNOSIS — Z78.9 OTHER SPECIFIED HEALTH STATUS: Chronic | ICD-10-CM

## 2025-06-08 DIAGNOSIS — R09.3 ABNORMAL SPUTUM: ICD-10-CM

## 2025-06-08 DIAGNOSIS — J02.9 ACUTE PHARYNGITIS, UNSPECIFIED: ICD-10-CM

## 2025-06-08 DIAGNOSIS — Z86.19 PERSONAL HISTORY OF OTHER INFECTIOUS AND PARASITIC DISEASES: ICD-10-CM

## 2025-06-08 DIAGNOSIS — N40.1 BENIGN PROSTATIC HYPERPLASIA WITH LOWER URINARY TRACT SYMPTOMS: ICD-10-CM

## 2025-06-08 DIAGNOSIS — R50.9 FEVER, UNSPECIFIED: ICD-10-CM

## 2025-06-08 DIAGNOSIS — R07.89 OTHER CHEST PAIN: ICD-10-CM

## 2025-06-08 LAB
FLUAV AG NPH QL: SIGNIFICANT CHANGE UP
FLUBV AG NPH QL: SIGNIFICANT CHANGE UP
RSV RNA NPH QL NAA+NON-PROBE: SIGNIFICANT CHANGE UP
SARS-COV-2 RNA SPEC QL NAA+PROBE: SIGNIFICANT CHANGE UP
SOURCE RESPIRATORY: SIGNIFICANT CHANGE UP

## 2025-06-08 PROCEDURE — 0241U: CPT

## 2025-06-08 PROCEDURE — 99283 EMERGENCY DEPT VISIT LOW MDM: CPT | Mod: 25

## 2025-06-08 PROCEDURE — 71046 X-RAY EXAM CHEST 2 VIEWS: CPT

## 2025-06-08 PROCEDURE — 99284 EMERGENCY DEPT VISIT MOD MDM: CPT

## 2025-06-08 PROCEDURE — 71046 X-RAY EXAM CHEST 2 VIEWS: CPT | Mod: 26

## 2025-06-08 RX ORDER — AMOXICILLIN AND CLAVULANATE POTASSIUM 500; 125 MG/1; MG/1
1 TABLET, FILM COATED ORAL
Qty: 14 | Refills: 0
Start: 2025-06-08 | End: 2025-06-14

## 2025-06-08 RX ORDER — DOXYCYCLINE HYCLATE 100 MG
1 TABLET ORAL
Qty: 14 | Refills: 0
Start: 2025-06-08 | End: 2025-06-14

## 2025-06-08 RX ORDER — IBUPROFEN 200 MG
400 TABLET ORAL ONCE
Refills: 0 | Status: COMPLETED | OUTPATIENT
Start: 2025-06-08 | End: 2025-06-08

## 2025-06-08 RX ADMIN — Medication 400 MILLIGRAM(S): at 11:00

## 2025-06-08 NOTE — ED PROVIDER NOTE - PATIENT PORTAL LINK FT
You can access the FollowMyHealth Patient Portal offered by [facility] by registering at the following website: [email or web address]. By joining [facility]’s FollowMyHealth portal, you will also be able to view your health information using other applications (apps) compatible with our system.

## 2025-06-08 NOTE — ED PROVIDER NOTE - OBJECTIVE STATEMENT
Patient is a 55-year-old male with past medical history of BPH, presenting today with cough.  Patient states that he has had cough for the last 2 days which has recently become productive with green sputum, with associated sore throat.  Patient states that his son was tested positive for strep throat 5 days ago, he was given amoxicillin prophylactically and is on day 2 of amoxicillin.  Patient states today he cough finally and felt a "pop" followed by sudden left-sided back pain near T10-T12, but not near the vertebrae.  Patient denies any radiation of the pain, fever, vomiting, diarrhea, LOC, chest pain, SOB, rash, abdominal pain.

## 2025-06-08 NOTE — ED PROVIDER NOTE - NSFOLLOWUPINSTRUCTIONS_ED_ALL_ED_FT
Discontinue amoxicillin and start Augmentin/doxycyline, avoid sunlight during duration of antibiotics and wear sunscreen if going outside.     Community Acquired Pneumonia    WHAT YOU NEED TO KNOW:    What is community-acquired pneumonia (CAP)? CAP is a lung infection that you get outside of a hospital or nursing home setting. Your lungs become inflamed and cannot work well. CAP may be caused by bacteria, viruses, or fungi.  The Lungs    What increases my risk for CAP?    Chronic lung disease    Cigarette smoking    Brain disorders such as stroke, dementia, and cerebral palsy    Weakened immune system    Recent surgery or trauma    Surgery for cancer of the mouth, throat, or neck    Medical conditions such as diabetes or heart disease  What are the signs and symptoms of CAP?    Cough that may bring up green, yellow, or bloody mucus    Fever, chills, or severe shaking    Shortness of breath    Breathing and heartbeat that are faster than usual    Pain in your chest or back when you breathe in or cough    Fatigue and loss of appetite    Trouble thinking clearly  How is CAP diagnosed? Your healthcare provider will listen to your lungs. You may need a chest x-ray. You may also need any of the following if you are admitted to the hospital:    CT scan pictures may show a lung infection or other problems, such as fluid around your lungs. You may be given contrast liquid to help your lungs show up better in the pictures. Tell the healthcare provider if you have ever had an allergic reaction to contrast liquid.    A pulse oximeter is a device that measures the amount of oxygen in your blood.  Pulse Oximeter      Blood and sputum tests may be done to check for the germ causing your infection.    Bronchoscopy is a procedure to look inside your airway and learn the cause of your airway or lung condition. A bronchoscope (thin tube with a light) is inserted into your mouth and moved down your throat to your airway. Tissue and fluid may be collected from your airway or lungs to be tested.    Nucleic acid-based testing, also called a PCR test, may be used to check for a virus causing your pneumonia. You may need the test if you have severe CAP or a weakened immune system.  How is CAP treated? Treatment will depend on the type of germ causing your CAP, and how bad your symptoms are. You may need antibiotics for at least 5 days if your pneumonia is caused by bacteria. Antiviral medicines may be given if you have viral pneumonia. You may need medicines that dilate your bronchial tubes. You may need oxygen if your blood oxygen level is lower than it should be. You may need to be admitted to the hospital if your pneumonia is severe.    What can I do to manage CAP?    Get plenty of rest. Rest helps your body heal.    Do not smoke or allow others to smoke around you. Nicotine and other chemicals in cigarettes and cigars can cause lung damage. Ask your healthcare provider for information if you currently smoke and need help to quit. E-cigarettes or smokeless tobacco still contain nicotine. Talk to your healthcare provider before you use these products.    Breathe warm, moist air. This helps loosen mucus. Loosely place a warm, wet washcloth over your nose and mouth. A room humidifier may also help make the air moist.    Gently tap your chest. This helps loosen mucus so it is easier to cough up.    Take deep breaths and cough. Deep breathing helps open the air passages in your lungs. Coughing helps bring up mucus from your lungs. Take a deep breath and hold the breath as long as you can. Then push the air out of your lungs with a deep, strong cough. Spit out any mucus you have coughed up. Take 10 deep breaths in a row every hour that you are awake. Remember to follow each deep breath with a cough.    Drink liquids as directed. Ask your healthcare provider how much liquid to drink each day and which liquids to drink. Liquids help make mucus thin and easier to get out of your body.  How can I prevent CAP?    Wash your hands often. Use soap for at least 20 seconds. Rinse with warm running water. Dry your hands with a clean towel or paper towel. Use hand  that contains alcohol if soap and water are not available. Wash your hands several times each day. Wash after you use the bathroom, change a child's diaper, and before you prepare or eat food. Do not touch your eyes, nose, or mouth without washing your hands first.  Handwashing      Cover a sneeze or cough. Use a tissue that covers your mouth and nose. Throw the tissue away in a trash can right away. Use the bend of your arm if a tissue is not available. Wash your hands well with soap and water or use a hand . Do not stand close to anyone who is sneezing or coughing.    Clean surfaces often. Clean doorknobs, countertops, cell phones, and other surfaces that are touched often. Use a disinfecting wipe, a single-use sponge, or a cloth you can wash and reuse. Use disinfecting  if you do not have wipes. You can create a disinfecting  by mixing 1 part bleach with 10 parts water.    Try to avoid people who have a cold or the flu. If you are sick, stay away from others as much as possible.    Ask about vaccines you may need. Your healthcare provider can tell you if you should also get vaccines other than those listed below, and when to get them:  Get an influenza (flu) vaccine as directed. The flu vaccine is recommended for everyone 6 months or older. Get the vaccine as soon as recommended each year, usually in September or October.    Get a COVID-19 vaccine as directed. At least 1 dose of an updated vaccine is recommended for everyone 6 months or older. COVID-19 vaccines are given as a shot in 1 to 3 doses, depending on the age of the person who receives it. COVID-19 vaccines are updated throughout the year. Your healthcare provider can help you schedule all needed doses as updated vaccines become available.    Get a pneumonia vaccine as directed. The vaccine is recommended for all adults aged 50 or older. Adults aged 19 to 49 years who are at high risk for pneumonia should also receive the vaccine. You may need 1 dose or 2. The number depends on the vaccine used and your risk factors. Children routinely receive 4 doses of the pneumonia vaccine, starting at 2 months.    When should I seek immediate care?    You are confused and cannot think clearly.    You have increased trouble breathing.    Your lips or fingernails turn gray or blue.  When should I call my doctor?    Your symptoms do not get better, or get worse.    You are urinating less, or not at all.    You have questions or concerns about your condition or care.

## 2025-06-08 NOTE — ED PROVIDER NOTE - ATTENDING CONTRIBUTION TO CARE
56 yo m hx bph   pt presents for eval of multiple complaints. pt endorses cough for 5 days which is now productive. + subj fevers.  pt also c/o worsening L lower lung pain after coughing fit. no ap, v,n. pt eating well.      vs sinus tach  gen- NAD, aaox3  card-sinus tach  lungs-ctab, no wheezing or rhonchi  abd-sntnd, no guarding or rebound  neuro- full str/sensation, cn ii-xii grossly intact, normal coordination

## 2025-06-08 NOTE — ED PROVIDER NOTE - CLINICAL SUMMARY MEDICAL DECISION MAKING FREE TEXT BOX
Throughout ED observation period, pt remained clinically and hemodynamically stable.  xr c/f developing pna, will tx w/ doxy and advise pcp f/u

## 2025-06-08 NOTE — ED PROVIDER NOTE - PHYSICAL EXAMINATION
VITAL SIGNS: I have reviewed nursing notes and confirm.  CONSTITUTIONAL: Well-developed; well-nourished; in no acute distress.  SKIN: Skin exam is warm and dry, no acute rash.  HEAD: Normocephalic; atraumatic.  EYES: conjunctiva and sclera clear.  ENT: No nasal discharge; airway clear.  CARD: S1, S2 normal; no murmurs, gallops, or rubs. Regular rate and rhythm.  RESP: Normal respiratory effort, no tachypnea or distress. Lungs CTAB, no wheezes, rales or rhonchi.  ABD: soft, NT/ND.  EXT: Normal ROM. No clubbing, cyanosis or edema.  NEURO: Alert, oriented. Grossly unremarkable. No focal deficits.  PSYCH: Cooperative, appropriate.